# Patient Record
Sex: FEMALE | Race: WHITE | NOT HISPANIC OR LATINO | Employment: FULL TIME | URBAN - METROPOLITAN AREA
[De-identification: names, ages, dates, MRNs, and addresses within clinical notes are randomized per-mention and may not be internally consistent; named-entity substitution may affect disease eponyms.]

---

## 2018-01-16 ENCOUNTER — PATIENT MESSAGE (OUTPATIENT)
Dept: ADMINISTRATIVE | Facility: HOSPITAL | Age: 58
End: 2018-01-16

## 2018-01-19 ENCOUNTER — PATIENT OUTREACH (OUTPATIENT)
Dept: ADMINISTRATIVE | Facility: HOSPITAL | Age: 58
End: 2018-01-19

## 2018-01-25 ENCOUNTER — OFFICE VISIT (OUTPATIENT)
Dept: FAMILY MEDICINE | Facility: CLINIC | Age: 58
End: 2018-01-25
Payer: COMMERCIAL

## 2018-01-25 ENCOUNTER — LAB VISIT (OUTPATIENT)
Dept: LAB | Facility: HOSPITAL | Age: 58
End: 2018-01-25
Attending: FAMILY MEDICINE
Payer: COMMERCIAL

## 2018-01-25 VITALS
SYSTOLIC BLOOD PRESSURE: 129 MMHG | BODY MASS INDEX: 27.28 KG/M2 | TEMPERATURE: 98 F | WEIGHT: 159.81 LBS | DIASTOLIC BLOOD PRESSURE: 70 MMHG | HEIGHT: 64 IN | HEART RATE: 62 BPM

## 2018-01-25 DIAGNOSIS — L50.8 CHRONIC URTICARIA: ICD-10-CM

## 2018-01-25 DIAGNOSIS — Z00.00 ROUTINE HISTORY AND PHYSICAL EXAMINATION OF ADULT: Primary | ICD-10-CM

## 2018-01-25 DIAGNOSIS — Z00.00 ROUTINE HISTORY AND PHYSICAL EXAMINATION OF ADULT: ICD-10-CM

## 2018-01-25 LAB
ALBUMIN SERPL BCP-MCNC: 3.7 G/DL
ALP SERPL-CCNC: 82 U/L
ALT SERPL W/O P-5'-P-CCNC: 16 U/L
ANION GAP SERPL CALC-SCNC: 9 MMOL/L
AST SERPL-CCNC: 19 U/L
BASOPHILS # BLD AUTO: 0.04 K/UL
BASOPHILS NFR BLD: 0.6 %
BILIRUB SERPL-MCNC: 0.3 MG/DL
BUN SERPL-MCNC: 20 MG/DL
CALCIUM SERPL-MCNC: 9.8 MG/DL
CHLORIDE SERPL-SCNC: 109 MMOL/L
CO2 SERPL-SCNC: 27 MMOL/L
CREAT SERPL-MCNC: 0.8 MG/DL
DIFFERENTIAL METHOD: ABNORMAL
EOSINOPHIL # BLD AUTO: 0.3 K/UL
EOSINOPHIL NFR BLD: 5 %
ERYTHROCYTE [DISTWIDTH] IN BLOOD BY AUTOMATED COUNT: 14.7 %
ERYTHROCYTE [SEDIMENTATION RATE] IN BLOOD BY WESTERGREN METHOD: 7 MM/HR
EST. GFR  (AFRICAN AMERICAN): >60 ML/MIN/1.73 M^2
EST. GFR  (NON AFRICAN AMERICAN): >60 ML/MIN/1.73 M^2
GLUCOSE SERPL-MCNC: 88 MG/DL
HCT VFR BLD AUTO: 41.2 %
HGB BLD-MCNC: 13.3 G/DL
IMM GRANULOCYTES # BLD AUTO: 0.01 K/UL
IMM GRANULOCYTES NFR BLD AUTO: 0.2 %
LYMPHOCYTES # BLD AUTO: 2.1 K/UL
LYMPHOCYTES NFR BLD: 32.3 %
MCH RBC QN AUTO: 28.8 PG
MCHC RBC AUTO-ENTMCNC: 32.3 G/DL
MCV RBC AUTO: 89 FL
MONOCYTES # BLD AUTO: 0.5 K/UL
MONOCYTES NFR BLD: 7.5 %
NEUTROPHILS # BLD AUTO: 3.5 K/UL
NEUTROPHILS NFR BLD: 54.4 %
NRBC BLD-RTO: 0 /100 WBC
PLATELET # BLD AUTO: 278 K/UL
PMV BLD AUTO: 12.5 FL
POTASSIUM SERPL-SCNC: 5.3 MMOL/L
PROT SERPL-MCNC: 7.2 G/DL
RBC # BLD AUTO: 4.62 M/UL
SODIUM SERPL-SCNC: 145 MMOL/L
TSH SERPL DL<=0.005 MIU/L-ACNC: 3.45 UIU/ML
WBC # BLD AUTO: 6.43 K/UL

## 2018-01-25 PROCEDURE — 36415 COLL VENOUS BLD VENIPUNCTURE: CPT | Mod: PO

## 2018-01-25 PROCEDURE — 85651 RBC SED RATE NONAUTOMATED: CPT | Mod: PO

## 2018-01-25 PROCEDURE — 80053 COMPREHEN METABOLIC PANEL: CPT

## 2018-01-25 PROCEDURE — 99999 PR PBB SHADOW E&M-EST. PATIENT-LVL III: CPT | Mod: PBBFAC,,, | Performed by: FAMILY MEDICINE

## 2018-01-25 PROCEDURE — 84443 ASSAY THYROID STIM HORMONE: CPT

## 2018-01-25 PROCEDURE — 85025 COMPLETE CBC W/AUTO DIFF WBC: CPT

## 2018-01-25 PROCEDURE — 99396 PREV VISIT EST AGE 40-64: CPT | Mod: S$GLB,,, | Performed by: FAMILY MEDICINE

## 2018-01-25 RX ORDER — MINERAL OIL
180 ENEMA (ML) RECTAL DAILY
COMMUNITY
End: 2018-09-21

## 2018-01-25 NOTE — PROGRESS NOTES
The patient presents for physical exam.  She's also had some problems with intermittent urticaria for about past 6 weeks.  She saw the dermatologist and is using Allegra but still gets symptoms intermittently.  No wheezing or chest pain or shortness of breath.  Unaware of any obvious exposures.  She does have celiac disease and has been watching her diet and has not had significant GI symptoms recently.  She does occasionally get some rash around the neck    Past Medical History:  Past Medical History:   Diagnosis Date    Celiac disease     DDD (degenerative disc disease), cervical     Duodenitis     Frozen shoulder syndrome     H. pylori infection     Menopausal symptoms      Past Surgical History:   Procedure Laterality Date     SECTION      COLONOSCOPY      COLONOSCOPY N/A 2016    Procedure: COLONOSCOPY;  Surgeon: Roberta Boateng MD;  Location: [facility];  Service: Endoscopy;  Laterality: N/A;    ESOPHAGOGASTRODUODENOSCOPY  [id number]    TUBAL LIGATION       Social History     Social History    Marital status:      Spouse name: N/A    Number of children: N/A    Years of education: N/A     Occupational History    Not on file.     Social History Main Topics    Smoking status: Never Smoker    Smokeless tobacco: Not on file    Alcohol use No    Drug use: No    Sexual activity: Not on file     Other Topics Concern    Not on file     Social History Narrative    No narrative on file     Family History   Problem Relation Age of Onset    Lung cancer Mother     Heart failure Father     Anuerysm Father      abd    Seizures Paternal Aunt      Review of patient's allergies indicates:   Allergen Reactions    Gluten protein     Sulfur      Current Outpatient Prescriptions on File Prior to Visit   Medication Sig Dispense Refill    vitamin D 1000 units Tab Take 185 mg by mouth once daily.      [DISCONTINUED] ascorbic acid (VITAMIN C) 500 MG tablet Take 500 mg by mouth once  "daily.      [DISCONTINUED] folic acid (FOLVITE) 1 MG tablet Take 1 tablet (1 mg total) by mouth once daily. 30 tablet 2    [DISCONTINUED] multivitamin capsule Take 1 capsule by mouth once daily.       No current facility-administered medications on file prior to visit.            ROS:  GENERAL: No fever, chills,  or significant weight changes.  HEENT: No headache or hearing complaints.  No dysphagia  Eyes: No vision complaints  CHEST: Denies HERNANDES, cyanosis, wheezing, cough and sputum production.  CARDIOVASCULAR: Denies chest pain, PND, orthopnea or reduced exercise tolerance.  ABDOMEN: Appetite fine. Denies diarrhea, abdominal pain, hematemesis or blood in stool.  URINARY: No flank pain, dysuria or hematuria.  MUSCULOSKELETAL: No warmth swelling or tenderness of the joints  NEUROLOGIC: No focal weakness numbness or paresthesia  PSYCHIATRIC: Denies depression        OBJECTIVE:     Vitals:    01/25/18 0849   BP: 129/70   Pulse: 62   Temp: 98.4 °F (36.9 °C)   Weight: 72.5 kg (159 lb 13.3 oz)   Height: 5' 4" (1.626 m)     Wt Readings from Last 3 Encounters:   01/25/18 72.5 kg (159 lb 13.3 oz)   06/28/16 73.2 kg (161 lb 6 oz)   06/02/16 71.7 kg (158 lb)     APPEARANCE: Well nourished, well developed, in no acute distress.    HEAD: Normocephalic.  Atraumatic.  No sinus tenderness.  EYES:   Right eye: Pupil reactive.  Conjunctiva clear.    Left eye: Pupil reactive.  Conjunctiva clear.    Both fundi:  Grossly normal to nondilated exam. EOMI.    EARS: TM's intact. Light reflex normal. No retraction or perforation.    NOSE:  clear.  MOUTH & THROAT:  No pharyngeal erythema or exudate. No lesions.  NECK: Supple. No bruits.  No JVD.  No cervical lymphadenopathy.  No thyromegaly.    CHEST: Breath sounds clear bilaterally.  Normal respiratory effort  CARDIOVASCULAR: Normal rate.  Regular rhythm.  No murmurs.  No rub.  No gallops.  ABDOMEN: Bowel sounds normal.  Soft.  No tenderness.  No organomegaly.  PERIPHERAL VASCULAR: No " cyanosis.  No clubbing.  No edema.  NEUROLOGIC: No focal findings.  MENTAL STATUS: Alert.  Oriented x 3.        Meg was seen today for annual exam.    Diagnoses and all orders for this visit:    Routine history and physical examination of adult  -     CBC auto differential; Future  -     Comprehensive metabolic panel; Future  -     TSH; Future    Chronic urticaria  -     CBC auto differential; Future  -     Comprehensive metabolic panel; Future  -     TSH; Future  -     Sedimentation rate, manual; Future  -     Ambulatory referral to Allergy      She'll continue the Allegra.Anticipatory guidance: Don't smoke.  Healthy diet and regular exercise recommended.  She has mammogram and Pap smear done through her gynecologist

## 2018-02-05 ENCOUNTER — OFFICE VISIT (OUTPATIENT)
Dept: ALLERGY | Facility: CLINIC | Age: 58
End: 2018-02-05
Payer: COMMERCIAL

## 2018-02-05 ENCOUNTER — LAB VISIT (OUTPATIENT)
Dept: LAB | Facility: HOSPITAL | Age: 58
End: 2018-02-05
Attending: ALLERGY & IMMUNOLOGY
Payer: COMMERCIAL

## 2018-02-05 VITALS — WEIGHT: 158.94 LBS | HEART RATE: 60 BPM | OXYGEN SATURATION: 98 % | BODY MASS INDEX: 27.13 KG/M2 | HEIGHT: 64 IN

## 2018-02-05 DIAGNOSIS — J31.0 OTHER CHRONIC RHINITIS: ICD-10-CM

## 2018-02-05 DIAGNOSIS — L50.8 CHRONIC URTICARIA: Primary | ICD-10-CM

## 2018-02-05 DIAGNOSIS — L50.8 CHRONIC URTICARIA: ICD-10-CM

## 2018-02-05 LAB
THYROPEROXIDASE IGG SERPL-ACNC: <6 IU/ML
TSH SERPL DL<=0.005 MIU/L-ACNC: 2.78 UIU/ML

## 2018-02-05 PROCEDURE — 84445 ASSAY OF TSI GLOBULIN: CPT

## 2018-02-05 PROCEDURE — 84165 PROTEIN E-PHORESIS SERUM: CPT | Mod: 26,,, | Performed by: PATHOLOGY

## 2018-02-05 PROCEDURE — 86376 MICROSOMAL ANTIBODY EACH: CPT

## 2018-02-05 PROCEDURE — 84443 ASSAY THYROID STIM HORMONE: CPT

## 2018-02-05 PROCEDURE — 99244 OFF/OP CNSLTJ NEW/EST MOD 40: CPT | Mod: S$GLB,,, | Performed by: ALLERGY & IMMUNOLOGY

## 2018-02-05 PROCEDURE — 88184 FLOWCYTOMETRY/ TC 1 MARKER: CPT

## 2018-02-05 PROCEDURE — 86003 ALLG SPEC IGE CRUDE XTRC EA: CPT

## 2018-02-05 PROCEDURE — 99999 PR PBB SHADOW E&M-EST. PATIENT-LVL III: CPT | Mod: PBBFAC,,, | Performed by: ALLERGY & IMMUNOLOGY

## 2018-02-05 PROCEDURE — 86003 ALLG SPEC IGE CRUDE XTRC EA: CPT | Mod: 59

## 2018-02-05 PROCEDURE — 84165 PROTEIN E-PHORESIS SERUM: CPT

## 2018-02-05 NOTE — LETTER
February 5, 2018      Pablo Ng MD  [address] [address]           [facility]  [address] [facility] [address] [place] [address]  Phone: [phone]          Patient: Meg Goff   MR Number: [id number]   YOB: 1960   Date of Visit: 2/5/2018       Dear Dr. Pablo Ng:    Thank you for referring Meg Goff to me for evaluation. Attached you will find relevant portions of my assessment and plan of care.    If you have questions, please do not hesitate to call me. I look forward to following Meg Goff along with you.    Sincerely,    Claudia Leroy MD    Enclosure  CC:  No Recipients    If you would like to receive this communication electronically, please contact [email or web address] or [phone] to request more information on [facility] access.    For providers and/or their staff who would like to refer a patient to [facility], please contact us through our one-stop-shop provider referral line, [facility], at [phone].    If you feel you have received this communication in error or would no longer like to receive these types of communications, please e-mail [email or web address]

## 2018-02-05 NOTE — PROGRESS NOTES
Subjective:       Patient ID: Meg Goff is a 57 y.o. female.    Chief Complaint:  Allergic Reaction (allergic reaction, no known cause.)      58 yo woman presents for consult from Dr Pablo Ng for hives. She states it started in November. Has been off and on, breaks out for a day then resolves and comes back randomly. No triggers. She has pictures of hives, they are red raised and very itchy. She states in November was at scalp line and she thought was because of night sweats. Then had some on elbows which were more like blisters, clear fluid and last 2-3 days then started in December with these hives. She does have celiac and knows you can get skin rashes but her diet is very well controlled, no changes. She has no swelling, no SOB, no rhinitis, no other symptoms with it. She has occ stuffy nose, not bad, no season. No asthma or eczema. No known food, insect or latex allergy. Has celiac and no other medical issues. No sinus surgery. She is taking allegra prn with relief. No daily medications.         Environmental History: see history section for home environment  Review of Systems   Constitutional: Negative for appetite change, chills, fatigue and fever.   HENT: Positive for congestion. Negative for ear discharge, ear pain, facial swelling, nosebleeds, postnasal drip, rhinorrhea, sinus pressure, sneezing, sore throat, trouble swallowing and voice change.    Eyes: Negative for discharge, redness, itching and visual disturbance.   Respiratory: Negative for cough, choking, chest tightness, shortness of breath and wheezing.    Cardiovascular: Negative for chest pain, palpitations and leg swelling.   Gastrointestinal: Negative for abdominal distention, abdominal pain, constipation, diarrhea, nausea and vomiting.   Genitourinary: Negative for difficulty urinating.   Musculoskeletal: Negative for arthralgias, gait problem, joint swelling and myalgias.   Skin: Positive for rash. Negative for color change.    Neurological: Negative for dizziness, syncope, weakness, light-headedness and headaches.   Hematological: Negative for adenopathy. Does not bruise/bleed easily.   Psychiatric/Behavioral: Negative for agitation, behavioral problems, confusion and sleep disturbance. The patient is not nervous/anxious.         Objective:    Physical Exam   Constitutional: She is oriented to person, place, and time. She appears well-developed and well-nourished. No distress.   HENT:   Head: Normocephalic and atraumatic.   Right Ear: Hearing, tympanic membrane, external ear and ear canal normal.   Left Ear: Hearing, tympanic membrane, external ear and ear canal normal.   Nose: No mucosal edema, rhinorrhea, sinus tenderness or septal deviation. No epistaxis. Right sinus exhibits no maxillary sinus tenderness and no frontal sinus tenderness. Left sinus exhibits no maxillary sinus tenderness and no frontal sinus tenderness.   Mouth/Throat: Uvula is midline, oropharynx is clear and moist and mucous membranes are normal. No uvula swelling.   Eyes: Conjunctivae are normal. Right eye exhibits no discharge. Left eye exhibits no discharge.   Neck: Normal range of motion. No thyromegaly present.   Cardiovascular: Normal rate, regular rhythm and normal heart sounds.    No murmur heard.  Pulmonary/Chest: Effort normal and breath sounds normal. No respiratory distress. She has no wheezes.   Abdominal: Soft. She exhibits no distension. There is no tenderness.   Musculoskeletal: Normal range of motion. She exhibits no edema or tenderness.   Lymphadenopathy:     She has no cervical adenopathy.   Neurological: She is alert and oriented to person, place, and time.   Skin: Skin is warm and dry. No rash noted. No erythema.   Psychiatric: She has a normal mood and affect. Her behavior is normal. Judgment and thought content normal.   Nursing note and vitals reviewed.      Laboratory:   none performed   Assessment:       1. Chronic urticaria    2. Other  chronic rhinitis         Plan:       1. Explained chronic hives to pt,a advised less likely allergy and more likely systemic vis idiopathic/autoimmune. Will send labs to further evaluate  2. continue allegra daily and can increase to bid if needed and benadryl 25 every 4 hours as needed  3. Phone review  4. Dr Ng notified of completed consult via [facility]

## 2018-02-06 LAB
ALBUMIN SERPL ELPH-MCNC: 4.03 G/DL
ALPHA1 GLOB SERPL ELPH-MCNC: 0.27 G/DL
ALPHA2 GLOB SERPL ELPH-MCNC: 0.81 G/DL
B-GLOBULIN SERPL ELPH-MCNC: 0.89 G/DL
GAMMA GLOB SERPL ELPH-MCNC: 0.99 G/DL
PATHOLOGIST INTERPRETATION SPE: NORMAL
PROT SERPL-MCNC: 7 G/DL

## 2018-02-07 LAB
A ALTERNATA IGE QN: <0.35 KU/L
A FUMIGATUS IGE QN: <0.35 KU/L
ALLERGEN WHEAT IGE: <0.35 KU/L
ALMOND IGE QN: <0.35 KU/L
BAHIA GRASS IGE QN: <0.35 KU/L
BALD CYPRESS IGE QN: <0.35 KU/L
CAT DANDER IGE QN: 0.46 KU/L
COMMON RAGWEED IGE QN: <0.35 KU/L
COW MILK IGE QN: <0.35 KU/L
D FARINAE IGE QN: 1.03 KU/L
D PTERONYSS IGE QN: 1.45 KU/L
DEPRECATED A ALTERNATA IGE RAST QL: NORMAL
DEPRECATED A FUMIGATUS IGE RAST QL: NORMAL
DEPRECATED ALMOND IGE RAST QL: NORMAL
DEPRECATED BAHIA GRASS IGE RAST QL: NORMAL
DEPRECATED BALD CYPRESS IGE RAST QL: NORMAL
DEPRECATED CAT DANDER IGE RAST QL: ABNORMAL
DEPRECATED COMMON RAGWEED IGE RAST QL: NORMAL
DEPRECATED COW MILK IGE RAST QL: NORMAL
DEPRECATED D FARINAE IGE RAST QL: ABNORMAL
DEPRECATED D PTERONYSS IGE RAST QL: ABNORMAL
DEPRECATED DOG DANDER IGE RAST QL: ABNORMAL
DEPRECATED EGG WHITE IGE RAST QL: NORMAL
DEPRECATED MARSH ELDER IGE RAST QL: NORMAL
DEPRECATED OAT IGE RAST QL: NORMAL
DEPRECATED PEANUT IGE RAST QL: NORMAL
DEPRECATED PECAN/HICK NUT IGE RAST QL: NORMAL
DEPRECATED PECAN/HICK TREE IGE RAST QL: NORMAL
DEPRECATED SHRIMP IGE RAST QL: NORMAL
DEPRECATED SOYBEAN IGE RAST QL: NORMAL
DEPRECATED TIMOTHY IGE RAST QL: NORMAL
DEPRECATED WHITE OAK IGE RAST QL: NORMAL
DOG DANDER IGE QN: 0.47 KU/L
EGG WHITE IGE QN: <0.35 KU/L
MARSH ELDER IGE QN: <0.35 KU/L
OAT IGE QN: <0.35 KU/L
PEANUT IGE QN: <0.35 KU/L
PECAN/HICK NUT IGE QN: <0.35 KU/L
PECAN/HICK TREE IGE QN: <0.35 KU/L
SHRIMP IGE QN: <0.35 KU/L
SOYBEAN IGE QN: <0.35 KU/L
TIMOTHY IGE QN: <0.35 KU/L
TSI SER-ACNC: <0.1 IU/L
WHEAT CLASS: NORMAL
WHITE OAK IGE QN: <0.35 KU/L

## 2018-02-14 LAB
CIU ASSOCIATED BASOPHIL ACTIVATION: 1.6 % (ref 0–12)
IGE RECEPTOR ANTIBODY: NORMAL

## 2018-02-15 ENCOUNTER — PATIENT MESSAGE (OUTPATIENT)
Dept: ALLERGY | Facility: CLINIC | Age: 58
End: 2018-02-15

## 2018-09-21 ENCOUNTER — OFFICE VISIT (OUTPATIENT)
Dept: FAMILY MEDICINE | Facility: CLINIC | Age: 58
End: 2018-09-21
Payer: COMMERCIAL

## 2018-09-21 VITALS
BODY MASS INDEX: 27.31 KG/M2 | WEIGHT: 160 LBS | TEMPERATURE: 100 F | DIASTOLIC BLOOD PRESSURE: 75 MMHG | HEART RATE: 70 BPM | SYSTOLIC BLOOD PRESSURE: 127 MMHG | HEIGHT: 64 IN

## 2018-09-21 DIAGNOSIS — J01.90 SUBACUTE SINUSITIS, UNSPECIFIED LOCATION: Primary | ICD-10-CM

## 2018-09-21 PROCEDURE — 99999 PR PBB SHADOW E&M-EST. PATIENT-LVL III: CPT | Mod: PBBFAC,,, | Performed by: FAMILY MEDICINE

## 2018-09-21 PROCEDURE — 3008F BODY MASS INDEX DOCD: CPT | Mod: CPTII,S$GLB,, | Performed by: FAMILY MEDICINE

## 2018-09-21 PROCEDURE — 99213 OFFICE O/P EST LOW 20 MIN: CPT | Mod: S$GLB,,, | Performed by: FAMILY MEDICINE

## 2018-09-21 RX ORDER — METHYLPREDNISOLONE 4 MG/1
TABLET ORAL
Qty: 21 TABLET | Refills: 0 | Status: SHIPPED | OUTPATIENT
Start: 2018-09-21 | End: 2018-12-21

## 2018-09-21 RX ORDER — MONTELUKAST SODIUM 10 MG/1
TABLET ORAL
Refills: 0 | COMMUNITY
Start: 2018-08-10 | End: 2018-09-21

## 2018-09-21 RX ORDER — PSEUDOEPHEDRINE HCL 120 MG/1
120 TABLET, FILM COATED, EXTENDED RELEASE ORAL
COMMUNITY
End: 2019-02-01

## 2018-09-21 RX ORDER — AMOXICILLIN AND CLAVULANATE POTASSIUM 875; 125 MG/1; MG/1
1 TABLET, FILM COATED ORAL 2 TIMES DAILY
Qty: 20 TABLET | Refills: 0 | Status: SHIPPED | OUTPATIENT
Start: 2018-09-21 | End: 2018-10-01

## 2018-09-21 NOTE — PROGRESS NOTES
Patient complains of sinus discomfort, congestion, sore throat, postnasal drainage, slight cough, no fever. Symptoms started over the past 2 months..  She did have an urgent care visit new was treated with steroids with temporary partial improvement but never resolved.  States some intermittent mild blood with the discharge during the past week when symptoms started increasing again.  Past Medical History:  Past Medical History:   Diagnosis Date    Celiac disease     DDD (degenerative disc disease), cervical     Duodenitis     Frozen shoulder syndrome     H. pylori infection     Menopausal symptoms      Past Surgical History:   Procedure Laterality Date     SECTION      COLONOSCOPY      COLONOSCOPY N/A 2016    Procedure: COLONOSCOPY;  Surgeon: Roberta Boateng MD;  Location: [facility];  Service: Endoscopy;  Laterality: N/A;    COLONOSCOPY N/A 2016    Performed by [facility] at [facility]    ESOPHAGOGASTRODUODENOSCOPY  [id number]    ESOPHAGOGASTRODUODENOSCOPY (EGD) N/A 2016    Performed by [facility] at [facility] ENDO    TUBAL LIGATION       Review of patient's allergies indicates:   Allergen Reactions    Gluten protein     Sulfur      Current Outpatient Medications on File Prior to Visit   Medication Sig Dispense Refill    pseudoephedrine (SUDAFED) 120 mg 12 hr tablet Take 120 mg by mouth every 12 (twelve) hours.      [DISCONTINUED] montelukast (SINGULAIR) 10 mg tablet TK 1 T PO  QD  0    [DISCONTINUED] fexofenadine (ALLEGRA) 180 MG tablet Take 180 mg by mouth once daily.      [DISCONTINUED] vitamin D 1000 units Tab Take 185 mg by mouth once daily.       No current facility-administered medications on file prior to visit.      Social History     Socioeconomic History    Marital status:      Spouse name: Not on file    Number of children: Not on file    Years of education: Not on file    Highest education level: Not on file   Social Needs     Financial resource strain: Not on file    Food insecurity - worry: Not on file    Food insecurity - inability: Not on file    Transportation needs - medical: Not on file    Transportation needs - non-medical: Not on file   Occupational History    Not on file   Tobacco Use    Smoking status: Never Smoker    Smokeless tobacco: Never Used   Substance and Sexual Activity    Alcohol use: No    Drug use: No    Sexual activity: Not on file   Other Topics Concern    Not on file   Social History Narrative    Not on file     Family History   Problem Relation Age of Onset    Lung cancer Mother     Heart failure Father     Anuerysm Father         abd    Seizures Paternal Aunt     Allergic rhinitis Neg Hx     Asthma Neg Hx     Atopy Neg Hx     Eczema Neg Hx     Immunodeficiency Neg Hx     Rhinitis Neg Hx     Urticaria Neg Hx     Angioedema Neg Hx     Allergies Neg Hx              Physical Exam: See vital signs note   HEENT: Mild maxillary sinus tenderness. Nose mild congestion. Ears: Tympanic membranes intact.  No effusion or erythema. Throat mild erythema no exudate. Mild postnasal drainage.   Neck supple no adenopathy.    Chest: Clear to auscultation. Normal respiratory effort.     Meg was seen today for sinus problem.    Diagnoses and all orders for this visit:    Subacute sinusitis, unspecified location    Other orders  -     amoxicillin-clavulanate 875-125mg (AUGMENTIN) 875-125 mg per tablet; Take 1 tablet by mouth 2 (two) times daily. for 10 days  -     methylPREDNISolone (MEDROL DOSEPACK) 4 mg tablet; follow package directions        Follow-up as needed if symptoms not improving with treatment prescribed in next few days.  If not resolving with above would probably recommend imaging.

## 2018-12-21 ENCOUNTER — TELEPHONE (OUTPATIENT)
Dept: FAMILY MEDICINE | Facility: CLINIC | Age: 58
End: 2018-12-21

## 2018-12-21 ENCOUNTER — OFFICE VISIT (OUTPATIENT)
Dept: FAMILY MEDICINE | Facility: CLINIC | Age: 58
End: 2018-12-21
Payer: COMMERCIAL

## 2018-12-21 VITALS
TEMPERATURE: 98 F | BODY MASS INDEX: 27.14 KG/M2 | WEIGHT: 159 LBS | DIASTOLIC BLOOD PRESSURE: 68 MMHG | SYSTOLIC BLOOD PRESSURE: 127 MMHG | HEIGHT: 64 IN | HEART RATE: 79 BPM

## 2018-12-21 DIAGNOSIS — J32.9 CHRONIC SINUSITIS, UNSPECIFIED LOCATION: Primary | ICD-10-CM

## 2018-12-21 PROCEDURE — 99213 OFFICE O/P EST LOW 20 MIN: CPT | Mod: S$GLB,,, | Performed by: FAMILY MEDICINE

## 2018-12-21 PROCEDURE — 99999 PR PBB SHADOW E&M-EST. PATIENT-LVL III: CPT | Mod: PBBFAC,,, | Performed by: FAMILY MEDICINE

## 2018-12-21 PROCEDURE — 3008F BODY MASS INDEX DOCD: CPT | Mod: CPTII,S$GLB,, | Performed by: FAMILY MEDICINE

## 2018-12-21 RX ORDER — DOXYCYCLINE 100 MG/1
100 CAPSULE ORAL 2 TIMES DAILY
Qty: 28 CAPSULE | Refills: 0 | Status: SHIPPED | OUTPATIENT
Start: 2018-12-21 | End: 2019-01-04

## 2018-12-21 RX ORDER — METHYLPREDNISOLONE 4 MG/1
TABLET ORAL
Qty: 21 TABLET | Refills: 0 | Status: SHIPPED | OUTPATIENT
Start: 2018-12-21 | End: 2019-02-01

## 2018-12-21 NOTE — PROGRESS NOTES
Patient complains of sinus discomfort, congestion, sore throat, postnasal drainage, slight cough, no fever.  She had symptoms going back into approximately July.  We saw her in September and treated her with Augmentin and steroids for sinusitis.  She states symptoms improved for a few weeks, then recurred again for the past approximately 2 months.  Seems to be little worse in the past week.    Past Medical History:  Past Medical History:   Diagnosis Date    Celiac disease     DDD (degenerative disc disease), cervical     Duodenitis     Frozen shoulder syndrome     H. pylori infection     Menopausal symptoms      Past Surgical History:   Procedure Laterality Date     SECTION      COLONOSCOPY      COLONOSCOPY N/A 2016    [facility]formed by [facility] at [facility] ENDO    ESOPHAGOGASTRODUODENOSCOPY  [id number]    ESOPHAGOGASTRODUODENOSCOPY (EGD) N/A 2016    Performed by [facility] at [facility] ENDO    TUBAL LIGATION       Review of patient's allergies indicates:   Allergen Reactions    Gluten protein     Sulfur      Current Outpatient Medications on File Prior to Visit   Medication Sig Dispense Refill    pseudoephedrine (SUDAFED) 120 mg 12 hr tablet Take 120 mg by mouth every 12 (twelve) hours.      [DISCONTINUED] methylPREDNISolone (MEDROL DOSEPACK) 4 mg tablet follow package directions 21 tablet 0     No current facility-administered medications on file prior to visit.      Social History     Socioeconomic History    Marital status:      Spouse name: Not on file    Number of children: Not on file    Years of education: Not on file    Highest education level: Not on file   Social Needs    Financial resource strain: Not on file    Food insecurity - worry: Not on file    Food insecurity - inability: Not on file    Transportation needs - medical: Not on file    Transportation needs - non-medical: Not on file   Occupational History    Not on file   Tobacco Use     Smoking status: Never Smoker    Smokeless tobacco: Never Used   Substance and Sexual Activity    Alcohol use: No    Drug use: No    Sexual activity: Not on file   Other Topics Concern    Not on file   Social History Narrative    Not on file     Family History   Problem Relation Age of Onset    Lung cancer Mother     Heart failure Father     Anuerysm Father         abd    Seizures Paternal Aunt     Allergic rhinitis Neg Hx     Asthma Neg Hx     Atopy Neg Hx     Eczema Neg Hx     Immunodeficiency Neg Hx     Rhinitis Neg Hx     Urticaria Neg Hx     Angioedema Neg Hx     Allergies Neg Hx              Physical Exam: See vital signs note   HEENT: Mild maxillary sinus tenderness. Nose mild congestion. Ears: Tympanic membranes intact.  No effusion or erythema. Throat mild erythema no exudate. Mild postnasal drainage.   Neck supple no adenopathy.    Chest: Clear to auscultation. Normal respiratory effort.     Meg was seen today for sinus problem.    Diagnoses and all orders for this visit:    Chronic sinusitis, unspecified location  -     CT Sinuses without Contrast; Future    Other orders  -     methylPREDNISolone (MEDROL DOSEPACK) 4 mg tablet; follow package directions  -     doxycycline (MONODOX) 100 MG capsule; Take 1 capsule (100 mg total) by mouth 2 (two) times daily. for 14 days      Arrange CT in 2 weeks after completing the doxycycline.  Further evaluation pending clinical response and results of imaging.  Follow-up if worsens or not resolved.

## 2018-12-21 NOTE — TELEPHONE ENCOUNTER
----- Message from Verito Dumas sent at 12/21/2018  8:53 AM CST -----  Contact: Patient   Patient would like a call back at [phone], Regards to getting work in for sinus/cold.    Thanks  td

## 2019-01-19 ENCOUNTER — HOSPITAL ENCOUNTER (OUTPATIENT)
Dept: RADIOLOGY | Facility: HOSPITAL | Age: 59
Discharge: HOME | End: 2019-01-19
Attending: FAMILY MEDICINE
Payer: COMMERCIAL

## 2019-01-19 DIAGNOSIS — J32.9 CHRONIC SINUSITIS, UNSPECIFIED LOCATION: ICD-10-CM

## 2019-01-19 PROCEDURE — 70486 CT MAXILLOFACIAL W/O DYE: CPT | Mod: TC

## 2019-02-01 ENCOUNTER — OFFICE VISIT (OUTPATIENT)
Dept: OTOLARYNGOLOGY | Facility: CLINIC | Age: 59
End: 2019-02-01
Payer: COMMERCIAL

## 2019-02-01 VITALS
DIASTOLIC BLOOD PRESSURE: 72 MMHG | WEIGHT: 161.38 LBS | HEART RATE: 64 BPM | SYSTOLIC BLOOD PRESSURE: 131 MMHG | BODY MASS INDEX: 27.7 KG/M2

## 2019-02-01 DIAGNOSIS — H69.93 DYSFUNCTION OF BOTH EUSTACHIAN TUBES: ICD-10-CM

## 2019-02-01 DIAGNOSIS — J01.90 SUBACUTE SINUSITIS, UNSPECIFIED LOCATION: Primary | ICD-10-CM

## 2019-02-01 PROCEDURE — 99999 PR PBB SHADOW E&M-EST. PATIENT-LVL III: CPT | Mod: PBBFAC,,, | Performed by: PHYSICIAN ASSISTANT

## 2019-02-01 PROCEDURE — 99244 OFF/OP CNSLTJ NEW/EST MOD 40: CPT | Mod: S$GLB,,, | Performed by: PHYSICIAN ASSISTANT

## 2019-02-01 PROCEDURE — 99999 PR PBB SHADOW E&M-EST. PATIENT-LVL III: ICD-10-PCS | Mod: PBBFAC,,, | Performed by: PHYSICIAN ASSISTANT

## 2019-02-01 PROCEDURE — 99244 PR OFFICE CONSULTATION,LEVEL IV: ICD-10-PCS | Mod: S$GLB,,, | Performed by: PHYSICIAN ASSISTANT

## 2019-02-01 RX ORDER — LEVOFLOXACIN 500 MG/1
500 TABLET, FILM COATED ORAL DAILY
Qty: 21 TABLET | Refills: 0 | Status: SHIPPED | OUTPATIENT
Start: 2019-02-01 | End: 2019-02-22

## 2019-02-01 RX ORDER — METRONIDAZOLE 500 MG/1
500 TABLET ORAL EVERY 12 HOURS
Qty: 42 TABLET | Refills: 0 | Status: SHIPPED | OUTPATIENT
Start: 2019-02-01 | End: 2019-02-22

## 2019-02-01 RX ORDER — PAROXETINE 10 MG/1
1 TABLET, FILM COATED ORAL DAILY
COMMUNITY
Start: 2019-01-03 | End: 2019-07-30

## 2019-02-01 NOTE — LETTER
February 1, 2019      Pablo Ng MD  [address] [facility] [address]           [facility]  [address] [place] [address]  Phone: [phone]  Fax: [phone]          Patient: Meg Goff   MR Number: [id number]   YOB: 1960   Date of Visit: 2/1/2019       Dear Dr. Pablo Ng:    Thank you for referring Meg Goff to me for evaluation. Attached you will find relevant portions of my assessment and plan of care.    If you have questions, please do not hesitate to call me. I look forward to following Meg Goff along with you.    Sincerely,    Monique Ansari PA-C    Enclosure  CC:  No Recipients    If you would like to receive this communication electronically, please contact [email or web address] or [phone] to request more information on [employer] access.    For providers and/or their staff who would like to refer a patient to [facility], please contact us through our one-stop-shop provider referral line, [facility], at [phone].    If you feel you have received this communication in error or would no longer like to receive these types of communications, please e-mail [email or web address]

## 2019-02-01 NOTE — PROGRESS NOTES
Referring Provider:    Pablo Ng Md  [id number] [place] [address]  Subjective:   Patient: Meg Goff [id number], :1960   Visit date:2019 11:57 AM    Chief Complaint:  Consult    HPI:  Meg is a 58 y.o. female who I was asked to see in consultation for evaluation of the following issue(s):  Recurrent sinusitis since late Summer 2018.  Before then, she typically had one sinus infection every 12-18 months.  She's been treated with Augmentin and Doxycycline as well as several steroid injections and steroid dose packs (2018 and 2018).  No previous sinus surgery.  She does not smoke.  She tried Flonase in past but did not like the aftertaste.      MAJOR SYMPTOMS:  Yes  Purulent anterior nasal discharge (comes and goes)  Yes  Purulent or discolored posterior nasal discharge  Yes  Nasal congestion or obstruction (alternates sides)  No  Facial Congestion or fullness  No  Hyposmia or anosmia  No  Fever    MINOR SYMPTOMS:  Yes  Headache (top of her head and behind her eyes)  Yes  Ear pain, pressure, or fullness - pressure and popping AU; muffled AU for minutes at a time  No  Halitosis  No  Dental pain  Yes  Fatigue    The diagnosis of acute sinusitis is based on the presence of at least 2 major or 1 major and at least 2 minor symptoms.  Meg does meet this criteria.  Clinical Practice Guideline for Acute Bacterial Rhinosinusitis in Children and Adults. Clin Infect Dis 2012; 54:e72    Onset:  5 months ago  Exacerbating factors: Yes - bending over  Relieving factors: temporary while on antibiotics and steroids  Timing:  unchanged          Review of Systems:  Negative unless checked off.  Gen:  []fever   [x]fatigue  HENT:  []nosebleeds  []dental problem   Eyes:  []photophobia  []visual disturbance  Resp:  []chest tightness []wheezing  Card:  []chest pain  []leg swelling  GI:  []abdominal pain []blood in stool  :  []dysuria  []hematuria  Musc:  []joint swelling  []gait problem  Skin:   []color change  []pallor  Neuro:  []seizures  []numbness  Hem:  []bruise/bleed easily  Psych:  []hallucinations  []behavioral problems  Allergy/Imm: is allergic to gluten protein and sulfur.    Her meds, allergies, medical, surgical, social & family histories were reviewed & updated:  -     She has a current medication list which includes the following prescription(s): paroxetine, levofloxacin, and metronidazole.  -     She  has a past medical history of Celiac disease, DDD (degenerative disc disease), cervical, Duodenitis, Frozen shoulder syndrome, H. pylori infection, and Menopausal symptoms.   -     She does not have any pertinent problems on file.   -     She  has a past surgical history that includes  section; Tubal ligation; Esophagogastroduodenoscopy (10/1790126); Colonoscopy; and Colonoscopy (N/A, 2016).  -     She  reports that  has never smoked. she has never used smokeless tobacco. She reports that she does not drink alcohol or use drugs.  -     Her family history includes Anuerysm in her father; Heart failure in her father; Lung cancer in her mother; Seizures in her paternal aunt.  -     She is allergic to gluten protein and sulfur.    Objective:     Physical Exam:  Vitals:  /72   Pulse 64   Wt 73.2 kg (161 lb 6 oz)   BMI 27.70 kg/m²   General appearance:  Well developed, well nourished    Eyes:  Extraocular motions intact, PERRL    Communication:  no hoarseness, no dysphonia    Ears:  Otoscopy of external auditory canals was normal and tympanic membranes were retracted with no SNONY, clinical speech reception thresholds grossly intact, no mass/lesion of auricle.  Nose:  No masses/lesions of external nose, nasal mucosa erythematous, septum w/spur on left, and turbinates were within normal limits.  Able to see head of left middle turbinate.  No sinus tenderness  Mouth:  No mass/lesion of lips, teeth, gums, hard/soft palate, tongue, tonsils, or oropharynx.  Invisalign retainers on her  teeth    Cardiovascular:  Radial Pulses +2     Neck & Lymphatics:  No cervical lymphadenopathy, no neck mass/crepitus/ asymmetry, trachea is midline, no thyroid enlargement/tenderness/mass.    Psych: Oriented x3,  Alert with normal mood and affect.     Respiration/Chest:  Symmetric expansion during respiration, normal respiratory effort.    Skin:  Warm and intact. No ulcerations of face, scalp, neck.      CT Sinus 12/2018:  EXAMINATION:  CT SINUSES WITHOUT CONTRAST    CLINICAL HISTORY:  Sinusitis, recurring, possible surgery;.    TECHNIQUE:  Low dose axial images were obtained through the head.  Coronal and sagittal reformations were also performed. Contrast was not administered.    COMPARISON:  None.    FINDINGS:  Minimal chronic mucosal thickening identified circumferentially throughout the left maxillary antrum measuring approximately 1.0 mm in maximal with.  A small polyp or retention cyst is identified at the medial wall of the upper left maxillary antrum measuring 4.5 cm.  The left ostiomeatal complex is patent.    The right maxillary antrum demonstrates minimal chronic mucosal thickening in the inferior medial wall.  Mucosal thickening measures approximately 2 mm in maximal thickness.  Mild mucosal thickening identified at the ostia.  The ostia and infundibulum of the right ostiomeatal complex are patent.    Mild scattered mucosal thickening and patchy opacification of the ethmoid air cells bilaterally.  Mild chronic vessel thickening identified left frontoethmoidal recess.  The frontal sinuses are small in size but otherwise clear.  Sphenoid sinuses are clear.    Mild leftward nasal septal deviation is identified with associated spurring.  Middle and inferior nasal turbinates are normal.    The orbits and globes are normal.  Facial soft tissues are normal.  The visualized intracranial contents are normal.        Previous Allergy testing 2/2018 with Dr. Leroy for hives:  Positive for cat, dog,  dustmites      Assessment & Plan:   Meg was seen today for consult.    Diagnoses and all orders for this visit:    Subacute sinusitis, unspecified location    Dysfunction of both eustachian tubes    Other orders  -     metroNIDAZOLE (FLAGYL) 500 MG tablet; Take 1 tablet (500 mg total) by mouth every 12 (twelve) hours. for 21 days  -     levoFLOXacin (LEVAQUIN) 500 MG tablet; Take 1 tablet (500 mg total) by mouth once daily. for 21 days      Reviewed her recent sinus CT report and images with patient in the clinic today.  She does sound symptomatic with ethmoid sinusitis.  Discussed that the small retention cyst would not be causing her symptoms.   Recommend prolonged course of oral antibiotics and then RTC in 4 weeks with Dr. Wilder for recheck.  Discussed that she should call sooner with worsening symptoms.  If no improvement with prolonged course of abx, may need to consider surgical intervention.  No additional steroids at this time.  OK for plain Mucinex BID and frequent nasal saline spray.    I would recommend the patient use OTC Flonase Sensimist nasal spray.  This formulation is an alcohol and fragrance free version of a nasal steroid spray, and is often very helpful for those patients that cannot tolerate other nasal steroid sprays.  It also delivers the medication in the form of a mist rather than a spray, and has a decreased incidence of epistaxis. We had a long discussion regarding the anatomy and function of the eustachian tube.  We discussed that the eustachian tube acts as a pump to keep the appropriate amount of pressure behind the ear drum.  Treatment consists of intranasal steroid spray.        We discussed her medical conditions, treatments and plan.  Meg should return to clinic if any issues arise (symptoms worsen or persist), otherwise we will see her back in the clinic In 4 weeks after abx completed.    Thank you for allowing me to participate in the care of Meg.

## 2019-04-16 ENCOUNTER — PATIENT MESSAGE (OUTPATIENT)
Dept: OTOLARYNGOLOGY | Facility: CLINIC | Age: 59
End: 2019-04-16

## 2019-04-17 RX ORDER — METHYLPREDNISOLONE 4 MG/1
TABLET ORAL
Qty: 1 PACKAGE | Refills: 0 | Status: SHIPPED | OUTPATIENT
Start: 2019-04-17 | End: 2019-07-30

## 2019-04-17 RX ORDER — AMOXICILLIN AND CLAVULANATE POTASSIUM 875; 125 MG/1; MG/1
1 TABLET, FILM COATED ORAL 2 TIMES DAILY
Qty: 20 TABLET | Refills: 0 | Status: SHIPPED | OUTPATIENT
Start: 2019-04-17 | End: 2019-04-27

## 2019-04-17 NOTE — TELEPHONE ENCOUNTER
Augmentin and Medrol Dose Denilson sent in as requested.  She needs to schedule followup with Dr. Wilder as discussed at her last clinic visit.

## 2019-07-29 ENCOUNTER — TELEPHONE (OUTPATIENT)
Dept: OTOLARYNGOLOGY | Facility: CLINIC | Age: 59
End: 2019-07-29

## 2019-07-29 NOTE — TELEPHONE ENCOUNTER
Pt scheduled to see Dr Wilder tomorrow at [place] location. Pt verbalized understanding.        ----- Message from Vandana Perry sent at 7/29/2019  2:01 PM CDT -----  Contact: pt  Please call pt @ [phone] regarding appt tomorrow,

## 2019-07-30 ENCOUNTER — OFFICE VISIT (OUTPATIENT)
Dept: OTOLARYNGOLOGY | Facility: CLINIC | Age: 59
End: 2019-07-30
Payer: COMMERCIAL

## 2019-07-30 VITALS
BODY MASS INDEX: 26.76 KG/M2 | DIASTOLIC BLOOD PRESSURE: 77 MMHG | HEART RATE: 69 BPM | HEIGHT: 64 IN | SYSTOLIC BLOOD PRESSURE: 133 MMHG | RESPIRATION RATE: 16 BRPM | WEIGHT: 156.75 LBS | TEMPERATURE: 97 F

## 2019-07-30 DIAGNOSIS — J34.3 NASAL TURBINATE HYPERTROPHY: ICD-10-CM

## 2019-07-30 DIAGNOSIS — H69.93 DYSFUNCTION OF BOTH EUSTACHIAN TUBES: Primary | ICD-10-CM

## 2019-07-30 PROCEDURE — 3008F PR BODY MASS INDEX (BMI) DOCUMENTED: ICD-10-PCS | Mod: CPTII,S$GLB,, | Performed by: OTOLARYNGOLOGY

## 2019-07-30 PROCEDURE — 99999 PR PBB SHADOW E&M-EST. PATIENT-LVL III: CPT | Mod: PBBFAC,,, | Performed by: OTOLARYNGOLOGY

## 2019-07-30 PROCEDURE — 99999 PR PBB SHADOW E&M-EST. PATIENT-LVL III: ICD-10-PCS | Mod: PBBFAC,,, | Performed by: OTOLARYNGOLOGY

## 2019-07-30 PROCEDURE — 99214 OFFICE O/P EST MOD 30 MIN: CPT | Mod: S$GLB,,, | Performed by: OTOLARYNGOLOGY

## 2019-07-30 PROCEDURE — 3008F BODY MASS INDEX DOCD: CPT | Mod: CPTII,S$GLB,, | Performed by: OTOLARYNGOLOGY

## 2019-07-30 PROCEDURE — 99214 PR OFFICE/OUTPT VISIT, EST, LEVL IV, 30-39 MIN: ICD-10-PCS | Mod: S$GLB,,, | Performed by: OTOLARYNGOLOGY

## 2019-07-30 NOTE — PROGRESS NOTES
Subjective:   Patient: Meg Goff [id number], :1960   Visit date:2019 10:20 AM    Chief Complaint:  surgery consult, infected sinus pocket, slight deviated sept    HPI:  Mge is a 59 y.o. female who is here for follow-up. She was last here in 2019 seen by Monique Ansari PA-C, for recurrent sinusitis. Pt reported chronic sinusitis since 2018 and received previous tx with Augmentin and Doxycycline from her PCP. W/u with CT was significant for L > R maxillary sinusitis and pt was tx with Levaquin and Flagyl x 21 days. Pt cx f/u appt in clinic after this. She contacted us in April c/o persistent symptoms and was tx with Augmentin and Medrol. She rtc on 19     The patient reports the following allergy/sinus symptoms (Negative unless checked off):   [] Sneezing   [x] Nasal congestion  [] Itchy nose  []  Rhinorrhea  [x] Watery eyes (left only)  [] Itchy eyes  [] Cough  [] Post-nasal drip  [x] Ear pressure  [x] Ear popping   [] Otorrhea  [x] Facial pressure  [] Asthma  [] Eczema  [] Other    Medications:  []Anti-histamines:   [] Allegra   [] Benadryl   [] Claritin   [] Xyzal   [] Zyrtec   [] other  [] Singulair  [] Nasal Sprays:   [] Astelin   [] Atrovent   [] Flonase   [] Nasacort   [] Nasonex   [] Other  [] SCIT  [] SLIT   [] Oral steroid  [] IM steroid  [] Antibiotics:   [] Amoxicillin   [x] Augmentin   [] Azithromycin   [] Bactrim   [] Cefdinir   [] Clindamycin   [x] Doxycycline   [x] Levaquin   [x] Other  [] Other    Severity: moderate    She has had 3 or 4 sinus infections in the past 12 months.     Allergy testing for this patient was not done.    Current smoker:  No    There has been a recent imaging study of the sinuses (none and CT sinuses).  Results for orders placed during the hospital encounter of 19   CT Sinuses without Contrast    Narrative EXAMINATION:  CT SINUSES WITHOUT CONTRAST    CLINICAL HISTORY:  Sinusitis, recurring, possible  surgery;.    TECHNIQUE:  Low dose axial images were obtained through the head.  Coronal and sagittal reformations were also performed. Contrast was not administered.    COMPARISON:  None.    FINDINGS:  Minimal chronic mucosal thickening identified circumferentially throughout the left maxillary antrum measuring approximately 1.0 mm in maximal with.  A small polyp or retention cyst is identified at the medial wall of the upper left maxillary antrum measuring 4.5 cm.  The left ostiomeatal complex is patent.    The right maxillary antrum demonstrates minimal chronic mucosal thickening in the inferior medial wall.  Mucosal thickening measures approximately 2 mm in maximal thickness.  Mild mucosal thickening identified at the ostia.  The ostia and infundibulum of the right ostiomeatal complex are patent.    Mild scattered mucosal thickening and patchy opacification of the ethmoid air cells bilaterally.  Mild chronic vessel thickening identified left frontoethmoidal recess.  The frontal sinuses are small in size but otherwise clear.  Sphenoid sinuses are clear.    Mild leftward nasal septal deviation is identified with associated spurring.  Middle and inferior nasal turbinates are normal.    The orbits and globes are normal.  Facial soft tissues are normal.  The visualized intracranial contents are normal.      Impression 1. Mild chronic mucosal thickening in the maxillary sinuses with small polyps or retention cysts in the left maxillary antrum.  2. Mild scattered chronic mucosal thickening and patchy opacification of the ethmoid sinuses.  3. Mild leftward nasal septal deviation.  All CT scans at this facility are performed  using dose modulation techniques as appropriate to performed exam including the following:  automated exposure control; adjustment of mA and/or kV according to the patients size (this includes techniques or standardized protocols for targeted exams where dose is matched to indication/reason for exam:  "i.e. extremities or head);  iterative reconstruction technique.      Electronically signed by: Armando Navarro MD  Date:    2019  Time:    09:59             Review of Systems:  -     Allergic/Immunologic: is allergic to gluten protein and sulfur..  -     Constitutional: Current temp: 97.4 °F (36.3 °C) (Tympanic)    Her meds, allergies, medical, surgical, social & family histories were reviewed & updated:  -     She has a current medication list which includes the following prescription(s): pseudoephedrine-ibuprofen.  -     She  has a past medical history of Celiac disease, DDD (degenerative disc disease), cervical, Duodenitis, Frozen shoulder syndrome, H. pylori infection, and Menopausal symptoms.   -     She does not have any pertinent problems on file.   -     She  has a past surgical history that includes  section; Tubal ligation; Esophagogastroduodenoscopy (10/5510550); Colonoscopy; and Colonoscopy (N/A, 2016).  -     She  reports that she has never smoked. She has never used smokeless tobacco. She reports that she does not drink alcohol or use drugs.  -     Her family history includes Anuerysm in her father; Heart failure in her father; Lung cancer in her mother; Seizures in her paternal aunt.  -     She is allergic to gluten protein and sulfur.    Objective:     Physical Exam:  Vitals:  /77 (BP Location: Left arm, Patient Position: Sitting, BP Method: Small (Automatic))   Pulse 69   Temp 97.4 °F (36.3 °C) (Tympanic)   Resp 16   Ht 5' 4" (1.626 m)   Wt 71.1 kg (156 lb 12 oz)   BMI 26.91 kg/m²   Appearance:  Well-developed, well-nourished.  Communication:  Able to communicate, no hoarseness.  Head & Face:  Normocephalic, atraumatic, no sinus tenderness, normal facial strength.  Eyes:  Extraocular motions intact.  Ears:  Otoscopy of external auditory canals and tympanic membranes was normal, clinical speech reception thresholds grossly intact, no mass/lesion of auricle.  Nose:  " No masses/lesions of external nose, nasal mucosa, septum, and turbinates were within normal limits.  Mouth:  No mass/lesion of lips, teeth, gums, hard/soft palate, tongue, tonsils, or oropharynx.  Neck & Lymphatics:  No cervical lymphadenopathy, no neck mass/crepitus/ asymmetry, trachea is midline, no thyroid enlargement/tenderness/mass.  Neuro/Psych: Alert with normal mood and affect.   Abdominal: Normal appearance.   Respiration/Chest:  Symmetric expansion during respiration, normal respiratory effort.  Skin:  Warm and intact  Cardiovascular:  No peripheral vascular edema or varicosities.    CT SINUSES WITHOUT CONTRAST    CLINICAL HISTORY:  Sinusitis, recurring, possible surgery;.    TECHNIQUE:  Low dose axial images were obtained through the head.  Coronal and sagittal reformations were also performed. Contrast was not administered.    COMPARISON:  None.    FINDINGS:  Minimal chronic mucosal thickening identified circumferentially throughout the left maxillary antrum measuring approximately 1.0 mm in maximal with.  A small polyp or retention cyst is identified at the medial wall of the upper left maxillary antrum measuring 4.5 cm.  The left ostiomeatal complex is patent.    The right maxillary antrum demonstrates minimal chronic mucosal thickening in the inferior medial wall.  Mucosal thickening measures approximately 2 mm in maximal thickness.  Mild mucosal thickening identified at the ostia.  The ostia and infundibulum of the right ostiomeatal complex are patent.    Mild scattered mucosal thickening and patchy opacification of the ethmoid air cells bilaterally.  Mild chronic vessel thickening identified left frontoethmoidal recess.  The frontal sinuses are small in size but otherwise clear.  Sphenoid sinuses are clear.    Mild leftward nasal septal deviation is identified with associated spurring.  Middle and inferior nasal turbinates are normal.    The orbits and globes are normal.  Facial soft tissues are  normal.  The visualized intracranial contents are normal.      Impression       1. Mild chronic mucosal thickening in the maxillary sinuses with small polyps or retention cysts in the left maxillary antrum.  2. Mild scattered chronic mucosal thickening and patchy opacification of the ethmoid sinuses.  3. Mild leftward nasal septal deviation.  All CT scans at this facility are performed  using dose modulation techniques as appropriate to performed exam including the following:  automated exposure control; adjustment of mA and/or kV according to the patients size (this includes techniques or standardized protocols for targeted exams where dose is matched to indication/reason for exam: i.e. extremities or head);  iterative reconstruction technique.         Assessment & Plan:   Meg was seen today for surgery consult, infected sinus pocket, slight deviated sept.    Diagnoses and all orders for this visit:    Dysfunction of both eustachian tubes    Nasal turbinate hypertrophy      Headaches and congestion are primary complaints.  Headaches are more likely migrainous than sinonasal.  Recommend Flonase.  Call if not improving after consistent use 3-4 weeks.             Ministerio Wilder MD

## 2019-07-31 ENCOUNTER — PATIENT MESSAGE (OUTPATIENT)
Dept: OTOLARYNGOLOGY | Facility: CLINIC | Age: 59
End: 2019-07-31

## 2019-07-31 RX ORDER — FLUTICASONE PROPIONATE 50 MCG
2 SPRAY, SUSPENSION (ML) NASAL DAILY
Qty: 1 BOTTLE | Refills: 12 | Status: SHIPPED | OUTPATIENT
Start: 2019-07-31 | End: 2022-02-02 | Stop reason: SDUPTHER

## 2019-11-06 ENCOUNTER — OFFICE VISIT (OUTPATIENT)
Dept: FAMILY MEDICINE | Facility: CLINIC | Age: 59
End: 2019-11-06
Payer: COMMERCIAL

## 2019-11-06 ENCOUNTER — LAB VISIT (OUTPATIENT)
Dept: LAB | Facility: HOSPITAL | Age: 59
End: 2019-11-06
Attending: FAMILY MEDICINE
Payer: COMMERCIAL

## 2019-11-06 VITALS
HEART RATE: 64 BPM | WEIGHT: 155.63 LBS | BODY MASS INDEX: 26.57 KG/M2 | TEMPERATURE: 99 F | SYSTOLIC BLOOD PRESSURE: 135 MMHG | HEIGHT: 64 IN | DIASTOLIC BLOOD PRESSURE: 75 MMHG

## 2019-11-06 DIAGNOSIS — L50.8 CHRONIC URTICARIA: ICD-10-CM

## 2019-11-06 DIAGNOSIS — Z13.220 ENCOUNTER FOR LIPID SCREENING FOR CARDIOVASCULAR DISEASE: ICD-10-CM

## 2019-11-06 DIAGNOSIS — K90.0 CELIAC DISEASE: ICD-10-CM

## 2019-11-06 DIAGNOSIS — Z13.6 ENCOUNTER FOR LIPID SCREENING FOR CARDIOVASCULAR DISEASE: ICD-10-CM

## 2019-11-06 DIAGNOSIS — F32.9 MAJOR DEPRESSIVE DISORDER WITH CURRENT ACTIVE EPISODE, UNSPECIFIED DEPRESSION EPISODE SEVERITY, UNSPECIFIED WHETHER RECURRENT: ICD-10-CM

## 2019-11-06 DIAGNOSIS — L50.8 CHRONIC URTICARIA: Primary | ICD-10-CM

## 2019-11-06 LAB
ALBUMIN SERPL BCP-MCNC: 4 G/DL (ref 3.5–5.2)
ALP SERPL-CCNC: 81 U/L (ref 55–135)
ALT SERPL W/O P-5'-P-CCNC: 13 U/L (ref 10–44)
ANION GAP SERPL CALC-SCNC: 7 MMOL/L (ref 8–16)
AST SERPL-CCNC: 15 U/L (ref 10–40)
BASOPHILS # BLD AUTO: 0.05 K/UL (ref 0–0.2)
BASOPHILS NFR BLD: 0.7 % (ref 0–1.9)
BILIRUB SERPL-MCNC: 0.3 MG/DL (ref 0.1–1)
BUN SERPL-MCNC: 18 MG/DL (ref 6–20)
CALCIUM SERPL-MCNC: 9.6 MG/DL (ref 8.7–10.5)
CHLORIDE SERPL-SCNC: 108 MMOL/L (ref 95–110)
CHOLEST SERPL-MCNC: 170 MG/DL (ref 120–199)
CHOLEST/HDLC SERPL: 2.7 {RATIO} (ref 2–5)
CO2 SERPL-SCNC: 27 MMOL/L (ref 23–29)
CREAT SERPL-MCNC: 0.9 MG/DL (ref 0.5–1.4)
DIFFERENTIAL METHOD: ABNORMAL
EOSINOPHIL # BLD AUTO: 0.3 K/UL (ref 0–0.5)
EOSINOPHIL NFR BLD: 3.4 % (ref 0–8)
ERYTHROCYTE [DISTWIDTH] IN BLOOD BY AUTOMATED COUNT: 14 % (ref 11.5–14.5)
EST. GFR  (AFRICAN AMERICAN): >60 ML/MIN/1.73 M^2
EST. GFR  (NON AFRICAN AMERICAN): >60 ML/MIN/1.73 M^2
GLUCOSE SERPL-MCNC: 88 MG/DL (ref 70–110)
HCT VFR BLD AUTO: 41.9 % (ref 37–48.5)
HDLC SERPL-MCNC: 63 MG/DL (ref 40–75)
HDLC SERPL: 37.1 % (ref 20–50)
HGB BLD-MCNC: 13.1 G/DL (ref 12–16)
IMM GRANULOCYTES # BLD AUTO: 0.02 K/UL (ref 0–0.04)
IMM GRANULOCYTES NFR BLD AUTO: 0.3 % (ref 0–0.5)
LDLC SERPL CALC-MCNC: 91 MG/DL (ref 63–159)
LYMPHOCYTES # BLD AUTO: 2.8 K/UL (ref 1–4.8)
LYMPHOCYTES NFR BLD: 37.3 % (ref 18–48)
MCH RBC QN AUTO: 28.5 PG (ref 27–31)
MCHC RBC AUTO-ENTMCNC: 31.3 G/DL (ref 32–36)
MCV RBC AUTO: 91 FL (ref 82–98)
MONOCYTES # BLD AUTO: 0.6 K/UL (ref 0.3–1)
MONOCYTES NFR BLD: 7.9 % (ref 4–15)
NEUTROPHILS # BLD AUTO: 3.8 K/UL (ref 1.8–7.7)
NEUTROPHILS NFR BLD: 50.4 % (ref 38–73)
NONHDLC SERPL-MCNC: 107 MG/DL
NRBC BLD-RTO: 0 /100 WBC
PLATELET # BLD AUTO: 276 K/UL (ref 150–350)
PMV BLD AUTO: 12.9 FL (ref 9.2–12.9)
POTASSIUM SERPL-SCNC: 4.6 MMOL/L (ref 3.5–5.1)
PROT SERPL-MCNC: 7.2 G/DL (ref 6–8.4)
RBC # BLD AUTO: 4.6 M/UL (ref 4–5.4)
SODIUM SERPL-SCNC: 142 MMOL/L (ref 136–145)
TRIGL SERPL-MCNC: 80 MG/DL (ref 30–150)
TSH SERPL DL<=0.005 MIU/L-ACNC: 2.14 UIU/ML (ref 0.4–4)
WBC # BLD AUTO: 7.43 K/UL (ref 3.9–12.7)

## 2019-11-06 PROCEDURE — 3008F PR BODY MASS INDEX (BMI) DOCUMENTED: ICD-10-PCS | Mod: CPTII,S$GLB,, | Performed by: FAMILY MEDICINE

## 2019-11-06 PROCEDURE — 80061 LIPID PANEL: CPT

## 2019-11-06 PROCEDURE — 84443 ASSAY THYROID STIM HORMONE: CPT

## 2019-11-06 PROCEDURE — 99999 PR PBB SHADOW E&M-EST. PATIENT-LVL III: ICD-10-PCS | Mod: PBBFAC,,, | Performed by: FAMILY MEDICINE

## 2019-11-06 PROCEDURE — 99214 OFFICE O/P EST MOD 30 MIN: CPT | Mod: S$GLB,,, | Performed by: FAMILY MEDICINE

## 2019-11-06 PROCEDURE — 99999 PR PBB SHADOW E&M-EST. PATIENT-LVL III: CPT | Mod: PBBFAC,,, | Performed by: FAMILY MEDICINE

## 2019-11-06 PROCEDURE — 80053 COMPREHEN METABOLIC PANEL: CPT

## 2019-11-06 PROCEDURE — 83516 IMMUNOASSAY NONANTIBODY: CPT

## 2019-11-06 PROCEDURE — 99214 PR OFFICE/OUTPT VISIT, EST, LEVL IV, 30-39 MIN: ICD-10-PCS | Mod: S$GLB,,, | Performed by: FAMILY MEDICINE

## 2019-11-06 PROCEDURE — 3008F BODY MASS INDEX DOCD: CPT | Mod: CPTII,S$GLB,, | Performed by: FAMILY MEDICINE

## 2019-11-06 PROCEDURE — 36415 COLL VENOUS BLD VENIPUNCTURE: CPT | Mod: PO

## 2019-11-06 PROCEDURE — 85025 COMPLETE CBC W/AUTO DIFF WBC: CPT

## 2019-11-06 RX ORDER — CETIRIZINE HYDROCHLORIDE 10 MG/1
10 TABLET ORAL NIGHTLY
Refills: 0 | COMMUNITY
Start: 2019-11-06 | End: 2022-10-21

## 2019-11-06 RX ORDER — FAMOTIDINE 20 MG/1
20 TABLET, FILM COATED ORAL 2 TIMES DAILY
Qty: 180 TABLET | Refills: 3 | Status: SHIPPED | OUTPATIENT
Start: 2019-11-06 | End: 2020-03-02

## 2019-11-06 RX ORDER — DIPHENHYDRAMINE HCL 25 MG
25 CAPSULE ORAL EVERY 4 HOURS PRN
COMMUNITY
Start: 2019-10-15 | End: 2020-03-02

## 2019-11-06 RX ORDER — SERTRALINE HYDROCHLORIDE 50 MG/1
TABLET, FILM COATED ORAL
Qty: 30 TABLET | Refills: 5 | Status: SHIPPED | OUTPATIENT
Start: 2019-11-06 | End: 2021-08-23

## 2019-11-07 NOTE — PROGRESS NOTES
Complains of feeling depressed.  She has had a lot of stress, family issues.  S felt overwhelmed.  No SI/HI.  She has taken antidepressants in the past, but only intermittently and as needed, never full treatment.  She has had some increase in her chronic urticaria recently which he relates to this.  She has had some intermittent hives.  Previous allergy evaluation.  Currently using p.r.n. Benadryl only.  Symptoms few months.  Celiac disease states states compliance with her diet      Diagnoses and all orders for this visit:    Chronic urticaria  -     CBC auto differential; Future  -     Comprehensive metabolic panel; Future  -     TSH; Future    Celiac disease  -     Tissue transglutaminase, IgA; Future    Major depressive disorder with current active episode, unspecified depression episode severity, unspecified whether recurrent    Encounter for lipid screening for cardiovascular disease  -     Lipid panel; Future    Other orders  -     sertraline (ZOLOFT) 50 MG tablet; Take 1/2 po daily for 1 week, then take 1 daily  -     cetirizine (ZYRTEC) 10 MG tablet; Take 1 tablet (10 mg total) by mouth every evening.  -     famotidine (PEPCID) 20 MG tablet; Take 1 tablet (20 mg total) by mouth 2 (two) times daily.      Follow-up 1 month            Past Medical History:  Past Medical History:   Diagnosis Date    Celiac disease     DDD (degenerative disc disease), cervical     Duodenitis     Frozen shoulder syndrome     H. pylori infection     Menopausal symptoms      Past Surgical History:   Procedure Laterality Date     SECTION      COLONOSCOPY      COLONOSCOPY N/A 2016    Procedure: COLONOSCOPY;  Surgeon: Roberta Boateng MD;  [facility];  Service: Endoscopy;  Laterality: N/A;    ESOPHAGOGASTRODUODENOSCOPY  10/[id number]    TUBAL LIGATION       Review of patient's allergies indicates:   Allergen Reactions    Gluten protein     Sulfur      Current Outpatient Medications on File Prior to  Visit   Medication Sig Dispense Refill    diphenhydrAMINE (BENADRYL) 25 mg capsule Take 25 mg by mouth every 4 (four) hours as needed.       fluticasone propionate (FLONASE) 50 mcg/actuation nasal spray 2 sprays (100 mcg total) by Each Nostril route once daily. 1 Bottle 12    pseudoephedrine-ibuprofen (ADVIL COLD AND SINUS)  mg Tab Take by mouth daily as needed.       No current facility-administered medications on file prior to visit.      Social History     Socioeconomic History    Marital status:      Spouse name: Not on file    Number of children: Not on file    Years of education: Not on file    Highest education level: Not on file   Occupational History    Not on file   Social Needs    Financial resource strain: Not on file    Food insecurity:     Worry: Not on file     Inability: Not on file    Transportation needs:     Medical: Not on file     Non-medical: Not on file   Tobacco Use    Smoking status: Never Smoker    Smokeless tobacco: Never Used   Substance and Sexual Activity    Alcohol use: No    Drug use: No    Sexual activity: Not on file   Lifestyle    Physical activity:     Days per week: Not on file     Minutes per session: Not on file    Stress: Not on file   Relationships    Social connections:     Talks on phone: Not on file     Gets together: Not on file     Attends [employer] service: Not on file     Active member of club or organization: Not on file     Attends meetings of clubs or organizations: Not on file     Relationship status: Not on file   Other Topics Concern    Not on file   Social History Narrative    Not on file     Family History   Problem Relation Age of Onset    Lung cancer Mother     Heart failure Father     Anuerysm Father         abd    Seizures Paternal Aunt     Allergic rhinitis Neg Hx     Asthma Neg Hx     Atopy Neg Hx     Eczema Neg Hx     Immunodeficiency Neg Hx     Rhinitis Neg Hx     Urticaria Neg Hx     Angioedema Neg Hx      "Allergies Neg Hx            ROS:  GENERAL: No fever, chills,  or significant weight changes.   CARDIOVASCULAR: Denies chest pain, PND, orthopnea or reduced exercise tolerance.  ABDOMEN: Appetite fine. Denies diarrhea, abdominal pain, hematemesis or blood in stool.  URINARY: No flank pain, dysuria or hematuria.    Vitals:    11/06/19 1014   BP: 135/75   Pulse: 64   Temp: 98.7 °F (37.1 °C)   Weight: 70.6 kg (155 lb 9.6 oz)   Height: 5' 4" (1.626 m)     Wt Readings from Last 3 Encounters:   11/06/19 70.6 kg (155 lb 9.6 oz)   07/30/19 71.1 kg (156 lb 12 oz)   02/01/19 73.2 kg (161 lb 6 oz)       OBJECTIVE:   APPEARANCE: Well nourished, well developed, in no acute distress.    HEAD: Normocephalic.  Atraumatic.  No sinus tenderness.  EYES:   Right eye: Pupil reactive.  Conjunctiva clear.    Left eye: Pupil reactive.  Conjunctiva clear.  EOMI.    EARS: TM's intact. Light reflex normal. No retraction or perforation.    NOSE:  clear.  MOUTH & THROAT:  No pharyngeal erythema or exudate. No lesions.  NECK: Supple. No bruits.  No JVD.  No cervical lymphadenopathy.  No thyromegaly.    CHEST: Breath sounds clear bilaterally.  Normal respiratory effort  CARDIOVASCULAR: Normal rate.  Regular rhythm.  No murmurs.  No rub.  No gallops.  ABDOMEN: Bowel sounds normal.  Soft.  No tenderness.  No organomegaly.  PERIPHERAL VASCULAR: No cyanosis.  No clubbing.  No edema.  NEUROLOGIC: No focal findings.  MENTAL STATUS: Alert.  Oriented x 3.        "

## 2019-11-08 LAB — TTG IGA SER-ACNC: 7 UNITS

## 2020-02-27 ENCOUNTER — PATIENT MESSAGE (OUTPATIENT)
Dept: FAMILY MEDICINE | Facility: CLINIC | Age: 60
End: 2020-02-27

## 2020-02-27 DIAGNOSIS — R19.7 DIARRHEA, UNSPECIFIED TYPE: Primary | ICD-10-CM

## 2020-03-02 ENCOUNTER — PATIENT MESSAGE (OUTPATIENT)
Dept: GASTROENTEROLOGY | Facility: CLINIC | Age: 60
End: 2020-03-02

## 2020-03-02 ENCOUNTER — HOSPITAL ENCOUNTER (OUTPATIENT)
Dept: RADIOLOGY | Facility: HOSPITAL | Age: 60
Discharge: HOME | End: 2020-03-02
Attending: INTERNAL MEDICINE
Payer: COMMERCIAL

## 2020-03-02 ENCOUNTER — OFFICE VISIT (OUTPATIENT)
Dept: GASTROENTEROLOGY | Facility: CLINIC | Age: 60
End: 2020-03-02
Payer: COMMERCIAL

## 2020-03-02 ENCOUNTER — PATIENT OUTREACH (OUTPATIENT)
Dept: ADMINISTRATIVE | Facility: OTHER | Age: 60
End: 2020-03-02

## 2020-03-02 VITALS
WEIGHT: 151 LBS | BODY MASS INDEX: 25.78 KG/M2 | SYSTOLIC BLOOD PRESSURE: 114 MMHG | DIASTOLIC BLOOD PRESSURE: 78 MMHG | HEIGHT: 64 IN | HEART RATE: 56 BPM

## 2020-03-02 DIAGNOSIS — K90.9 DIARRHEA DUE TO MALABSORPTION: ICD-10-CM

## 2020-03-02 DIAGNOSIS — R19.7 DIARRHEA DUE TO MALABSORPTION: ICD-10-CM

## 2020-03-02 DIAGNOSIS — K90.0 CELIAC DISEASE: ICD-10-CM

## 2020-03-02 DIAGNOSIS — K90.0 CELIAC DISEASE: Primary | ICD-10-CM

## 2020-03-02 DIAGNOSIS — Z12.31 ENCOUNTER FOR SCREENING MAMMOGRAM FOR MALIGNANT NEOPLASM OF BREAST: Primary | ICD-10-CM

## 2020-03-02 PROCEDURE — 99999 PR PBB SHADOW E&M-EST. PATIENT-LVL IV: CPT | Mod: PBBFAC,,, | Performed by: INTERNAL MEDICINE

## 2020-03-02 PROCEDURE — 71045 XR CHEST 1 VIEW: ICD-10-PCS | Mod: 26,,, | Performed by: RADIOLOGY

## 2020-03-02 PROCEDURE — 99999 PR PBB SHADOW E&M-EST. PATIENT-LVL IV: ICD-10-PCS | Mod: PBBFAC,,, | Performed by: INTERNAL MEDICINE

## 2020-03-02 PROCEDURE — 99244 OFF/OP CNSLTJ NEW/EST MOD 40: CPT | Mod: S$GLB,,, | Performed by: INTERNAL MEDICINE

## 2020-03-02 PROCEDURE — 71045 X-RAY EXAM CHEST 1 VIEW: CPT | Mod: 26,,, | Performed by: RADIOLOGY

## 2020-03-02 PROCEDURE — 99244 PR OFFICE CONSULTATION,LEVEL IV: ICD-10-PCS | Mod: S$GLB,,, | Performed by: INTERNAL MEDICINE

## 2020-03-02 PROCEDURE — 71045 X-RAY EXAM CHEST 1 VIEW: CPT | Mod: TC

## 2020-03-02 NOTE — LETTER
March 2, 2020      Pablo Ng MD  [address]  [place] [address]           [facility]  [address] [place] [id number]  Phone: [phone]  Fax: [phone]          Patient: Meg Goff   MR Number: [id number]   YOB: 1960   Date of Visit: 3/2/2020       Dear Dr. Pablo Ng:    Thank you for referring Meg Goff to me for evaluation. Attached you will find relevant portions of my assessment and plan of care.    If you have questions, please do not hesitate to call me. I look forward to following Meg Goff along with you.    Sincerely,    Ganga Dejesus MD    Enclosure  CC:  No Recipients    If you would like to receive this communication electronically, please contact [email or web address] or [phone] to request more information on [facility] Link access.    For providers and/or their staff who would like to refer a patient to [facility], please contact us through our one-stop-shop provider referral line, [facility], at [phone].    If you feel you have received this communication in error or would no longer like to receive these types of communications, please e-mail [email or web address]

## 2020-03-02 NOTE — PROGRESS NOTES
"1. Have you been admitted overnight to the hospital in the past 3 months? no   If yes, schedule an appointment with PCP before scheduling endoscopic procedure.     2. Have you had a stent placed in the last 12 months? no   If yes, for a screening visit, cancel and message the ordering provider.  The patient will need a new order when the time is appropriate.     3. Have you had a stroke or heart attack in the past 6 months? no   If yes, cancel and refer patient to ordering provider for clearance, also message ordering provider to inform.     4. Have you had any chest pain in the past 3 months? no   If yes, Have you been evaluated by your PCP and/or cardiologist and it was determined to not be heart related? not applicable   If No, Pt needs to be seen by PCP or Cardiologist .  Pt can be scheduled once clearance obtained by either of those providers.     5. Do you take prescription weight loss medications?  no   If yes, must stop for 2 weeks prior to procedure.     6. Have you been diagnosed with diverticulitis within the past 3 months? no   If yes, must have been seen by GI within the last 3 months, if not schedule with GI [facility].    If pt has been seen by GI, schedule procedure 8-12 weeks post antibiotic treatment.     7. Are you on Dialysis? no  If yes, schedule procedure for the day AFTER dialysis.  Appt time should be 9am or later, patient arrival time is 2 hours prior.  Nulytely or miralax prep for all patients with kidney disease.     8. Are you diabetic?  no   If yes, schedule morning appt. Advise pt to hold all diabetic meds day of procedure.     9. If pt is older than 80 years of age and HAS NOT been seen by GI or PCP within the last 6 months, needs appt with GI [facility].   If pt has been seen by the GI provider or PCP within the past 6  months AND meets criteria, schedule procedure AND send message to the endoscopist.     10. Is patient on a "high risk" medication (blood thinner/antiplatelet agent)?  no   If yes, " has cardiac clearance been obtained within the last 60 days? N/A   If no, a new clearance needs to be obtained.       I have reviewed the last colonoscopy for recommendations regarding next procedure bowel prep.  yes  I have reviewed medications and allergies.  yes  I have verified the pharmacy information and appropriate prep sent if needed. yes  Prep instructions have been mailed or sent to portal per patient request. no handed to pt while in the clinic    If answers yes to any of the following, schedule at [facility] ONLY. If No, OK for either location.     Is BMI over 45?  25  Any complaints of chest pain, new onset or at rest? No  Does pt have an AICD? no  Is there a diagnosis of heart failure? no  Is patient on dialysis? no  Does patient have an insulin pump? no  If procedure for esophageal banding? no

## 2020-03-02 NOTE — PATIENT INSTRUCTIONS
Thanks for trusting us with your healthcare needs and using [facility]. If you want to ask us a question, you can do so by replying to this message or by calling [phone].    Sincerely,    Ganga Dejesus M.D.      To rate your experience with Dr. Dejesus please click on the link below:    [email or web address]          Celiac Disease     With celiac disease, villi inside the small intestine become damaged and cannot absorb nutrients properly.     Celiac disease is caused by a sensitivity or allergy to gluten. This is a protein found in many grains such as wheat, barley, and rye. Celiac disease affects villi (tiny, fingerlike stalks) in the small bowel (intestine). Normally, the villi make it possible for the small bowel to absorb nutrients from the food you eat. But celiac disease damages the villi. As a result, you cant absorb the nutrients you need, even if you eat plenty of food. Celiac disease is an autoimmune disease. You can manage the disease by removing gluten from your diet. This relieves your symptoms. It also reverses the damage to your small bowel. Celiac disease is sometimes called celiac sprue.  Causes of celiac disease  Celiac disease may have a genetic component. This means it can be passed down in families. If your healthcare provider thinks that you have celiac disease, he or she may advise that other members of your family be checked for it as well.  Signs and symptoms of celiac disease  The symptoms of celiac disease can vary for each person. Some people have no symptoms at all. If symptoms do happen, they can include:  · Diarrhea, constipation, or both  · Light colored, foul-smelling or fatty stool  · Belly pain and cramping  · Belly swelling or bloating  · Weight loss  · Bone or joint pain  · Iron deficiency  · Headaches  · Tiredness and loss of energy  · Mood changes, irritability, and depression  · Infertility  · Unexplained elevated liver  tests  · Canker sores  · Skin rash  · Tooth enamel problems  Diagnosing celiac disease  Your healthcare provider will ask about your symptoms and health history. Youll also have a physical exam. Tests are then done to confirm the problem. These can include:  · Blood tests. These help check for specific proteins in the blood that are present with celiac disease. They also check for anemia and help rule out other problems. The tests are done by taking a blood sample.  · Upper endoscopy with biopsy. This is done to see inside the stomach and duodenum (first part of the small bowel). For the test, an endoscope is used. This is a thin, flexible tube with a tiny camera on the end. Its inserted through the mouth and down into the stomach and duodenum. Tools are passed through the endoscope to remove tiny tissue samples (biopsy). The tissue samples are taken to a lab and looked at under a microscope. This is to check the tiny villi for damage. This test must be done while you are still eating food with gluten. This is the only way to see whether the presence of gluten is damaging the villi.  · Genetic tests. These check for problems with specific genes linked to celiac disease. They are done by taking blood samples.  Treating celiac disease  To treat celiac disease, you must remove all sources of gluten from your diet. This will allow the villi to heal, so that nutrients can be absorbed normally. Its important to follow a strict, gluten-free diet daily, even if you dont have symptoms. If you dont do this, the small bowel can become permanently damaged, which can lead to serious health problems. These include bone disease, cancer of the small bowel, and various nervous system disorders.  Sources of gluten  Gluten is found in wheat, barley, and rye. The most common foods with gluten are those made with wheat flour. These include bread, pasta, cake, and cereal. Gluten is also often found in beer, gravies, salad dressings,  "and most packaged foods. It is even found in some nonfood products, such as certain medicines and cosmetics. Your healthcare provider can refer you to a dietitian to  you about what you should avoid. The resources below will also give you lists of food and products that contain gluten.  Follow-up  Youll meet with your healthcare provider periodically to monitor your health. During these visits, routine blood tests are often done to make sure your condition is under control. Your healthcare provider can also refer you to other healthcare providers or support and advocacy groups to help you cope with your condition.  Learning more about celiac disease  The following resources can help you learn more about celiac disease and how to manage it:  · Celiac Disease Foundation, www.celiac.org  · Celiac Sprue Association, www.csaceliacs.org  · Gluten Intolerance Group, www.gluten.net  · National [place] of Diabetes and Digestive and Kidney Diseases, www2.niddk.nih.gov   Date Last Reviewed: 7/1/2016  © 0694-6458 [employer]. [address] [place] [address], [place] [address]. All rights reserved. This information is not intended as a substitute for professional medical care. Always follow your healthcare professional's instructions.        Gluten-Free Diet for Celiac Disease     Reading food labels will help you avoid foods with gluten.   You've been told that you have celiac disease. This means that you are sensitive to a protein called gluten. Gluten is found in certain grains. When you ingest gluten, your immune system causes harm to your small intestines. The treatment for celiac disease is to avoid foods and products that contain gluten. You will need to do this for the rest of your life. Avoid the temptation to "cheat," even a small amount of gluten can cause symptoms to return. And it can harm your body. This sheet gives you the basics about a gluten-free diet. If you need help, a registered dietitian " "(RD) can teach you what foods and other products have gluten and how to avoid them.  Always read labels!  Many foods may contain gluten, even if you think they don't. Get into the habit of reading ingredient labels before you eat.   Choosing foods  The most common source of gluten is wheat flour (this includes "white" flour). Wheat flour is used to make many baked goods, including breads, pastas, cereals, pastries, and pizza dough. But gluten is also found in many foods that you might not think would have it. You will need to read food labels to look for gluten in everything you eat. But your diet does not need to be boring. Many foods are naturally gluten-free. And many foods commonly made with wheat flour now come in gluten-free forms. But keep in mind that if something is labeled "wheat-free" it may not be also gluten-free.  Foods to Avoid Foods You Can Eat   Bread, cereals, pasta, pastries, couscous, or pizza dough made with wheat flour (this includes white flour, farina, farro, emmer, durum, rahul, and semolina) Bread, cereals, pasta, pastries, or pizza dough made with rice flour, almond flour, beans, potatoes, and other gluten-free substitutes   Foods containing rye, barley (including malt), spelt, kamut, triticale, simon's yeast, and bulgur Foods containing corn, cassava, rice, amaranth, buckwheat, millet, quinoa, arrowroot, teff, soy, and tapioca   Processed meats Fresh meats and seafood (beef, chicken, turkey, lamb, pork, fish, shellfish), beans, and tofu   Some dairy products with additives Many plain dairy products   Many sauces, gravies, dressings, and condiments, including traditional soy sauce Vinegar, oils, and gluten-free substitutes   Some granola bars and energy bars Gluten-free granola bars and energy bars   Some beers and spirits Wine, and gluten-free beers and distilled spirits   Some soups Gluten-free soups   Fruits and vegetables that are fried or breaded Fresh fruits and vegetables   Many " "packaged foods Packaged foods labeled "gluten-free"   Oats (check with your healthcare provider) Gluten-free oats   Communion wafers Gluten-free communion wafers   Avoiding accidental exposure to gluten  Staying gluten-free means always being aware. Even if you are very careful, mistakes can happen. The food you eat can't come into contact with gluten. Your meals must be made with utensils that have not touched foods that contain gluten. Shared knives, cutting boards, toasters, and storage containers are risks for gluten exposure. Shared condiments may have crumbs that contain gluten. At restaurants, parties, and other places where you eat food prepared by others, ask how the food was made. Gluten can also be found in some non-food items. Some medications contain gluten. So do some vitamin supplements. Ask your pharmacist before taking a medication or supplement. Also, some shampoos, lotions, toothpastes, makeup, lipstick (lip gloss and lip balm), glues, soaps, and other products contain gluten. It can be possible to ingest some gluten when using these projects. This is called cross-contamination. For example, this can happen if you use a lotion that has gluten and then touch food you eat. Also note that Play-Lorena and similar products have gluten. Any adult or child with celiac disease should wash their hands after handling these.  Coping with gluten-free living  Living gluten-free can be hard. While there are many gluten-free foods now that you can buy, it is still a big change for many people. You may be upset that you can't eat your favorite foods, eat freely at restaurants, parties, or over the holidays. Household members may also be upset by the strict controls over food. If you face problems like these, think about joining a celiac disease support group. Support groups offer tips on how to make a gluten-free lifestyle easier on you and the people you live with. You can find ways to involve the people in your " household. There are many ways to make gluten-free group meals. See More Resources below for help in finding a group.  Bring safe foods that you enjoy to parties and school or work events. This can help you avoid the urge to grab something you shouldnt eat.   Following up with your health care provider  You should see your health care provider at least once a year for a celiac checkup. A simple blood test can show if your celiac disease is under control. If you are having symptoms, your health care provider can help you find sources of gluten you may have missed.  More resources  To learn more about managing celiac disease, try these resources:  · Celiac Disease Foundation  www.celiac.org  · Academy of Nutrition and Dietetics  www.eatright.org  · National Digestive Diseases Information Clearinghouse  www.digestive.niddk.nih.gov  Date Last Reviewed: 6/19/2015  © 8515-2987 [employer]. [address] [place] [address]. All rights reserved. This information is not intended as a substitute for professional medical care. Always follow your healthcare professional's instructions.    Celiac disease  -     MRI Abdomen-Pelvis w w/o Contrast (XPD); Future; Expected date: 03/03/2020  -     Case request GI: ESOPHAGOGASTRODUODENOSCOPY (EGD)  -     Celiac Disease Panel; Future; Expected date: 03/02/2020  -     Ambulatory referral/consult to Nutrition Services; Future; Expected date: 03/09/2020  -     X-Ray Chest 1 View; Future; Expected date: 03/02/2020  -     Basic metabolic panel; Future; Expected date: 03/02/2020  -     Protime-INR; Future; Expected date: 03/02/2020  -     CBC auto differential; Future; Expected date: 03/02/2020  -     TSH; Future; Expected date: 03/02/2020    Diarrhea due to malabsorption  -     Ambulatory referral/consult to Gastroenterology

## 2020-03-02 NOTE — PROGRESS NOTES
[facility]  Gastroenterology Note    Patient referred at the request of:  Pablo Ng [place]  [id number] [place] [address]    Subjective:       Patient ID: Meg Goff is a 60 y.o. female.    Chief Complaint: Diarrhea (hx celiac disease)    Mrs. Goff is a 60 year old  female who was diagnosed with celiac disease in 2016. She had a positive TTG IgA in  and recently has turned to normal values, after partially adhering to a celiac diet. In 2016 and EGD with biopsies showed evidence of villous blunting and a colonoscopy with random biopsies was essentially normal, except for diverticulosis. Now she presents with bouts of recurring diarrhea, urgency and sometimes loss of sphincter control. This does not happen often.     Diarrhea    This is a recurrent problem. The current episode started more than 1 year ago. The problem occurs 2 to 4 times per day. The problem has been waxing and waning. The stool consistency is described as mucous and watery. The patient states that diarrhea does not awaken her from sleep. Pertinent negatives include no abdominal pain, arthralgias, bloating, chills, coughing, fever, headaches, increased  flatus, myalgias, sweats, URI, vomiting or weight loss. The symptoms are aggravated by rye/wheat and stress. There are no known risk factors. She has tried change of diet for the symptoms. The treatment provided moderate relief. Her past medical history is significant for malabsorption. There is no history of bowel resection, inflammatory bowel disease, irritable bowel syndrome, a recent abdominal surgery or short gut syndrome.     Past Medical History:   Diagnosis Date    Celiac disease     DDD (degenerative disc disease), cervical     Duodenitis     Frozen shoulder syndrome     H. pylori infection     Menopausal symptoms      Past Surgical History:   Procedure Laterality Date     SECTION      COLONOSCOPY      COLONOSCOPY N/A 2016     Procedure: COLONOSCOPY;  Surgeon: Roberta Boateng MD;  Location[facility];  Service: Endoscopy;  Laterality: N/A;    ESOPHAGOGASTRODUODENOSCOPY  [id number]    TUBAL LIGATION       Current Outpatient Medications on File Prior to Visit   Medication Sig Dispense Refill    cetirizine (ZYRTEC) 10 MG tablet Take 1 tablet (10 mg total) by mouth every evening.  0    fluticasone propionate (FLONASE) 50 mcg/actuation nasal spray 2 sprays (100 mcg total) by Each Nostril route once daily. 1 Bottle 12    sertraline (ZOLOFT) 50 MG tablet Take 1/2 po daily for 1 week, then take 1 daily 30 tablet 5    [DISCONTINUED] diphenhydrAMINE (BENADRYL) 25 mg capsule Take 25 mg by mouth every 4 (four) hours as needed.       [DISCONTINUED] famotidine (PEPCID) 20 MG tablet Take 1 tablet (20 mg total) by mouth 2 (two) times daily. 180 tablet 3    [DISCONTINUED] pseudoephedrine-ibuprofen (ADVIL COLD AND SINUS)  mg Tab Take by mouth daily as needed.       No current facility-administered medications on file prior to visit.      Review of patient's allergies indicates:   Allergen Reactions    Gluten protein     Sulfur      Social History     Socioeconomic History    Marital status:      Spouse name: Not on file    Number of children: Not on file    Years of education: Not on file    Highest education level: Not on file   Occupational History    Not on file   Social Needs    Financial resource strain: Not on file    Food insecurity:     Worry: Not on file     Inability: Not on file    Transportation needs:     Medical: Not on file     Non-medical: Not on file   Tobacco Use    Smoking status: Never Smoker    Smokeless tobacco: Never Used   Substance and Sexual Activity    Alcohol use: No    Drug use: No    Sexual activity: Not on file   Lifestyle    Physical activity:     Days per week: Not on file     Minutes per session: Not on file    Stress: Not on file   Relationships    Social connections:     Talks  "on phone: Not on file     Gets together: Not on file     Attends [employer] service: Not on file     Active member of club or organization: Not on file     Attends meetings of clubs or organizations: Not on file     Relationship status: Not on file   Other Topics Concern    Not on file   Social History Narrative    Not on file     Family History   Problem Relation Age of Onset    Lung cancer Mother     Heart failure Father     Anuerysm Father         abd    Seizures Paternal Aunt     Allergic rhinitis Neg Hx     Asthma Neg Hx     Atopy Neg Hx     Eczema Neg Hx     Immunodeficiency Neg Hx     Rhinitis Neg Hx     Urticaria Neg Hx     Angioedema Neg Hx     Allergies Neg Hx        Review of Systems   Constitutional: Negative for activity change, chills, diaphoresis, fatigue, fever, unexpected weight change and weight loss.   HENT: Negative for ear pain, facial swelling, nosebleeds, rhinorrhea, sneezing, sore throat and trouble swallowing.    Eyes: Negative for photophobia, pain and visual disturbance.   Respiratory: Negative for apnea, cough, chest tightness, shortness of breath and wheezing.    Gastrointestinal: Positive for diarrhea. Negative for abdominal pain, bloating, blood in stool, constipation, flatus, nausea, rectal pain and vomiting.   Genitourinary: Negative for decreased urine volume, difficulty urinating, dysuria, flank pain and hematuria.   Musculoskeletal: Negative for arthralgias, back pain, gait problem, myalgias, neck pain and neck stiffness.   Skin: Negative for pallor and rash.   Neurological: Negative for seizures, syncope, speech difficulty, weakness and headaches.   Hematological: Negative for adenopathy. Does not bruise/bleed easily.   Psychiatric/Behavioral: Negative for behavioral problems, confusion and hallucinations.       Objective:       Vitals:    03/02/20 0754   BP: 114/78   Pulse: (!) 56   Weight: 68.5 kg (151 lb 0.2 oz)   Height: 5' 4" (1.626 m)       Physical Exam "   Constitutional: She is oriented to person, place, and time. She appears well-developed and well-nourished.   HENT:   Head: Normocephalic and atraumatic.   Eyes: Pupils are equal, round, and reactive to light. EOM are normal.   Neck: Normal range of motion. Neck supple. No thyromegaly present.   Cardiovascular: Normal rate, regular rhythm, normal heart sounds and intact distal pulses.   No murmur heard.  Pulmonary/Chest: Effort normal and breath sounds normal. No respiratory distress. She has no wheezes. She has no rales.   Abdominal: Soft. Bowel sounds are normal. She exhibits no distension and no mass. There is no tenderness.   Musculoskeletal: Normal range of motion. She exhibits no edema or tenderness.   Lymphadenopathy:     She has no cervical adenopathy.   Neurological: She is alert and oriented to person, place, and time. She has normal reflexes. No cranial nerve deficit.   Skin: Skin is warm and dry. No erythema.   Psychiatric: She has a normal mood and affect. Her behavior is normal.         Assessment:       1. Celiac disease    2. Diarrhea due to malabsorption        Plan:       Celiac disease  -     MRI Abdomen-Pelvis w w/o Contrast (XPD); Future; Expected date: 03/03/2020  -     Case request GI: ESOPHAGOGASTRODUODENOSCOPY (EGD)  -     Celiac Disease Panel; Future; Expected date: 03/02/2020  -     Ambulatory referral/consult to Nutrition Services; Future; Expected date: 03/09/2020  -     X-Ray Chest 1 View; Future; Expected date: 03/02/2020  -     Basic metabolic panel; Future; Expected date: 03/02/2020  -     Protime-INR; Future; Expected date: 03/02/2020  -     CBC auto differential; Future; Expected date: 03/02/2020  -     TSH; Future; Expected date: 03/02/2020    Follow up in about 1 year (around 3/2/2021), or if symptoms worsen or fail to improve. Patient had a normal colonoscopy in 2016 with normal biopsies, no evidence of microscopic colitis was seen. She does have diverticulosis. Follow up  recommended with GI every year.     Risks, benefits and alternative options were discussed with the patient. Risks including but not limited to infection, bleeding, heart or respiratory problems and perforation that may require surgery were all explained to the patient. The patient had a chance for questions if there were doubts or concerns about the test. The referring provider will be notified that our consultation is complete and available through the patient's records.    Thanks for letting us participate in the care of this nice patient,      Ganga Dejesus

## 2020-03-05 ENCOUNTER — PATIENT MESSAGE (OUTPATIENT)
Dept: GASTROENTEROLOGY | Facility: CLINIC | Age: 60
End: 2020-03-05

## 2020-03-05 ENCOUNTER — TELEPHONE (OUTPATIENT)
Dept: GASTROENTEROLOGY | Facility: CLINIC | Age: 60
End: 2020-03-05

## 2020-03-05 NOTE — TELEPHONE ENCOUNTER
----- Message from Monique Buchanan LPN sent at 3/3/2020  2:09 PM CST -----  Mary Carmen De La Rosa, ANDERSON Chicas RD, CDE; Monique Buchanan LPN  Cc: Ruby Babcock RD, CDE         Roberta Halphen is the RD for nutrition consults.   If you add a nutrition referral, it should go to schedulers for Roberta.   Previous Messages        ----- Message -----   From: Kavita Chicas RD, CDE   Sent: 3/2/2020   1:22 PM CST   To: Monique Buchanan LPN, #   Subject: Nutrition Referral                               The diabetes department only sees Diabetes patients now, our schedules are private. The number to schedule diabetes patients is [phone]. We have referral coordinators who will schedule the appts. Any nutrition consults other than Diabetes should be ordered as a nutrition referral.   Thanks!     ----- Message -----   From: Monique Buchanan LPN   Sent: 3/2/2020   1:15 PM CST   To: , #     Pt needs an appt with Nutritional services.   I was not able to book this, can someone contact the pt to schedule?   Referral is done by Dr Dejesus.     Can we schedule on DM or RD?     Thanks   Monique GODDARD   Gastro dept

## 2020-03-05 NOTE — H&P (VIEW-ONLY)
Your bloodwork looks fine and does not require any change in treatment.     Please contact me if you have any additional concerns.    Sincerely,    Ganga Dejesus

## 2020-03-12 ENCOUNTER — ANESTHESIA EVENT (OUTPATIENT)
Dept: ENDOSCOPY | Facility: HOSPITAL | Age: 60
End: 2020-03-12
Payer: COMMERCIAL

## 2020-03-12 ENCOUNTER — ANESTHESIA (OUTPATIENT)
Dept: ENDOSCOPY | Facility: HOSPITAL | Age: 60
End: 2020-03-12
Payer: COMMERCIAL

## 2020-03-12 ENCOUNTER — HOSPITAL ENCOUNTER (OUTPATIENT)
Facility: HOSPITAL | Age: 60
Discharge: HOME | End: 2020-03-12
Attending: INTERNAL MEDICINE | Admitting: INTERNAL MEDICINE
Payer: COMMERCIAL

## 2020-03-12 VITALS
SYSTOLIC BLOOD PRESSURE: 130 MMHG | TEMPERATURE: 97 F | BODY MASS INDEX: 25.56 KG/M2 | HEIGHT: 64 IN | WEIGHT: 149.69 LBS | HEART RATE: 60 BPM | RESPIRATION RATE: 20 BRPM | DIASTOLIC BLOOD PRESSURE: 74 MMHG | OXYGEN SATURATION: 96 %

## 2020-03-12 DIAGNOSIS — K90.0 CELIAC DISEASE: ICD-10-CM

## 2020-03-12 PROCEDURE — 88341 IMHCHEM/IMCYTCHM EA ADD ANTB: CPT | Performed by: PATHOLOGY

## 2020-03-12 PROCEDURE — 37000008 HC ANESTHESIA 1ST 15 MINUTES: Performed by: INTERNAL MEDICINE

## 2020-03-12 PROCEDURE — 43239 EGD BIOPSY SINGLE/MULTIPLE: CPT | Mod: ,,, | Performed by: INTERNAL MEDICINE

## 2020-03-12 PROCEDURE — 88342 IMHCHEM/IMCYTCHM 1ST ANTB: CPT | Mod: 26,,, | Performed by: PATHOLOGY

## 2020-03-12 PROCEDURE — 27201012 HC FORCEPS, HOT/COLD, DISP: Performed by: INTERNAL MEDICINE

## 2020-03-12 PROCEDURE — 43239 PR EGD, FLEX, W/BIOPSY, SGL/MULTI: ICD-10-PCS | Mod: ,,, | Performed by: INTERNAL MEDICINE

## 2020-03-12 PROCEDURE — 88342 CHG IMMUNOCYTOCHEMISTRY: ICD-10-PCS | Mod: 26,,, | Performed by: PATHOLOGY

## 2020-03-12 PROCEDURE — 63600175 PHARM REV CODE 636 W HCPCS: Performed by: NURSE ANESTHETIST, CERTIFIED REGISTERED

## 2020-03-12 PROCEDURE — 88305 TISSUE EXAM BY PATHOLOGIST: CPT | Mod: 26,,, | Performed by: PATHOLOGY

## 2020-03-12 PROCEDURE — 43239 EGD BIOPSY SINGLE/MULTIPLE: CPT | Performed by: INTERNAL MEDICINE

## 2020-03-12 PROCEDURE — 88341 IMHCHEM/IMCYTCHM EA ADD ANTB: CPT | Mod: 26,,, | Performed by: PATHOLOGY

## 2020-03-12 PROCEDURE — 25000003 PHARM REV CODE 250: Performed by: NURSE ANESTHETIST, CERTIFIED REGISTERED

## 2020-03-12 PROCEDURE — 88342 IMHCHEM/IMCYTCHM 1ST ANTB: CPT | Performed by: PATHOLOGY

## 2020-03-12 PROCEDURE — 88341 PR IHC OR ICC EACH ADD'L SINGLE ANTIBODY  STAINPR: ICD-10-PCS | Mod: 26,,, | Performed by: PATHOLOGY

## 2020-03-12 PROCEDURE — 88305 TISSUE EXAM BY PATHOLOGIST: CPT | Performed by: PATHOLOGY

## 2020-03-12 PROCEDURE — 88305 TISSUE EXAM BY PATHOLOGIST: ICD-10-PCS | Mod: 26,,, | Performed by: PATHOLOGY

## 2020-03-12 RX ORDER — SODIUM CHLORIDE, SODIUM LACTATE, POTASSIUM CHLORIDE, CALCIUM CHLORIDE 600; 310; 30; 20 MG/100ML; MG/100ML; MG/100ML; MG/100ML
INJECTION, SOLUTION INTRAVENOUS CONTINUOUS PRN
Status: DISCONTINUED | OUTPATIENT
Start: 2020-03-12 | End: 2020-03-12

## 2020-03-12 RX ORDER — GLYCOPYRROLATE 0.2 MG/ML
INJECTION INTRAMUSCULAR; INTRAVENOUS
Status: DISCONTINUED | OUTPATIENT
Start: 2020-03-12 | End: 2020-03-12

## 2020-03-12 RX ORDER — PROPOFOL 10 MG/ML
INJECTION, EMULSION INTRAVENOUS
Status: DISCONTINUED | OUTPATIENT
Start: 2020-03-12 | End: 2020-03-12

## 2020-03-12 RX ORDER — LIDOCAINE HCL/PF 100 MG/5ML
SYRINGE (ML) INTRAVENOUS
Status: DISCONTINUED | OUTPATIENT
Start: 2020-03-12 | End: 2020-03-12

## 2020-03-12 RX ORDER — SODIUM CHLORIDE, SODIUM LACTATE, POTASSIUM CHLORIDE, CALCIUM CHLORIDE 600; 310; 30; 20 MG/100ML; MG/100ML; MG/100ML; MG/100ML
INJECTION, SOLUTION INTRAVENOUS CONTINUOUS
Status: DISCONTINUED | OUTPATIENT
Start: 2020-03-12 | End: 2020-03-12 | Stop reason: HOSPADM

## 2020-03-12 RX ADMIN — GLYCOPYRROLATE 0.2 MG: 0.2 INJECTION INTRAMUSCULAR; INTRAVENOUS at 10:03

## 2020-03-12 RX ADMIN — LIDOCAINE HYDROCHLORIDE 50 MG: 20 INJECTION, SOLUTION INTRAVENOUS at 10:03

## 2020-03-12 RX ADMIN — PROPOFOL 30 MG: 10 INJECTION, EMULSION INTRAVENOUS at 10:03

## 2020-03-12 RX ADMIN — SODIUM CHLORIDE, SODIUM LACTATE, POTASSIUM CHLORIDE, AND CALCIUM CHLORIDE: 600; 310; 30; 20 INJECTION, SOLUTION INTRAVENOUS at 10:03

## 2020-03-12 RX ADMIN — PROPOFOL 40 MG: 10 INJECTION, EMULSION INTRAVENOUS at 10:03

## 2020-03-12 RX ADMIN — PROPOFOL 100 MG: 10 INJECTION, EMULSION INTRAVENOUS at 10:03

## 2020-03-12 NOTE — DISCHARGE INSTRUCTIONS

## 2020-03-12 NOTE — BRIEF OP NOTE
Endoscopy Discharge Summary      Admit Date: 3/12/2020    Discharge Date and Time:  3/12/2020 10:25 AM    Attending Physician: Maureen Santacruz MD     Discharge Physician: Maureen Santacruz MD     Principal Admitting Diagnoses: <principal problem not specified>         Discharge Diagnosis: The encounter diagnosis was Celiac disease.     Discharged Condition: Good    Indication for Admission: <principal problem not specified>     Hospital Course: Patient was admitted for an inpatient procedure and tolerated the procedure well with no complications.    Significant Diagnostic Studies: EGD with biopsy    Pathology (if any):  Specimen (12h ago, onward)    None          Estimated Blood Loss: 3 ml.    Discussed with: patient and family.    Disposition: Home.    Follow Up/Patient Instructions:   Current Discharge Medication List      CONTINUE these medications which have NOT CHANGED    Details   cetirizine (ZYRTEC) 10 MG tablet Take 1 tablet (10 mg total) by mouth every evening.  Refills: 0      fluticasone propionate (FLONASE) 50 mcg/actuation nasal spray 2 sprays (100 mcg total) by Each Nostril route once daily.  Qty: 1 Bottle, Refills: 12      sertraline (ZOLOFT) 50 MG tablet Take 1/2 po daily for 1 week, then take 1 daily  Qty: 30 tablet, Refills: 5             No discharge procedures on file.

## 2020-03-12 NOTE — TRANSFER OF CARE
"Anesthesia Transfer of Care Note    Patient: Meg Goff    Procedure(s) Performed: Procedure(s) (LRB):  ESOPHAGOGASTRODUODENOSCOPY (EGD) (Left)    Patient location: PACU    Anesthesia Type: MAC    Transport from OR: Transported from OR on room air with adequate spontaneous ventilation    Post pain: adequate analgesia    Post assessment: no apparent anesthetic complications    Post vital signs: stable    Level of consciousness: responds to stimulation    Nausea/Vomiting: no nausea/vomiting    Complications: none    Transfer of care protocol was followed      Last vitals:   Visit Vitals  /65   Pulse (!) 59   Temp 36.2 °C (97.2 °F)   Resp 18   Ht 5' 4" (1.626 m)   Wt 67.9 kg (149 lb 11.1 oz)   SpO2 98%   BMI 25.69 kg/m²     "

## 2020-03-12 NOTE — ANESTHESIA RELEASE NOTE
"Anesthesia Release from PACU Note    Patient: Meg Goff    Procedure(s) Performed: Procedure(s) (LRB):  ESOPHAGOGASTRODUODENOSCOPY (EGD) (Left)    Anesthesia type: MAC    Post pain: Adequate analgesia    Post assessment: no apparent anesthetic complications, tolerated procedure well and no evidence of recall    Last Vitals:   Visit Vitals  /65   Pulse (!) 59   Temp 36.2 °C (97.2 °F)   Resp 18   Ht 5' 4" (1.626 m)   Wt 67.9 kg (149 lb 11.1 oz)   SpO2 98%   BMI 25.69 kg/m²       Post vital signs: stable    Level of consciousness: responds to stimulation    Nausea/Vomiting: no nausea/no vomiting    Complications: none    Airway Patency: patent    Respiratory: unassisted    Cardiovascular: stable and blood pressure at baseline    Hydration: euvolemic     "

## 2020-03-12 NOTE — ANESTHESIA PREPROCEDURE EVALUATION
03/12/2020  Meg Goff is a 60 y.o., female.    Anesthesia Evaluation    I have reviewed the Patient Summary Reports.    I have reviewed the Nursing Notes.   I have reviewed the Medications.     Review of Systems  Anesthesia Hx:  No problems with previous Anesthesia  Denies Family Hx of Anesthesia complications.   Denies Personal Hx of Anesthesia complications.   Social:  Non-Smoker    Hematology/Oncology:  Hematology Normal   Oncology Normal     EENT/Dental:EENT/Dental Normal   Cardiovascular:  Cardiovascular Normal Exercise tolerance: good     Pulmonary:  Pulmonary Normal    Renal/:  Renal/ Normal     Musculoskeletal:   Arthritis     Neurological:  Neurology Normal    Endocrine:  Endocrine Normal    Dermatological:  Skin Normal    Psych:  Psychiatric Normal           Physical Exam  General:  Well nourished    Airway/Jaw/Neck:  Airway Findings: Mouth Opening: Normal Tongue: Normal  General Airway Assessment: Adult, Average  Mallampati: II  TM Distance: Normal, at least 6 cm      Dental:  Dental Findings: In tact   Chest/Lungs:  Chest/Lungs Findings: Clear to auscultation, Normal Respiratory Rate     Heart/Vascular:  Heart Findings: Rate: Normal  Rhythm: Regular Rhythm        Mental Status:  Mental Status Findings:  Cooperative, Alert and Oriented         Anesthesia Plan  Type of Anesthesia, risks & benefits discussed:  Anesthesia Type:  MAC  Patient's Preference:   Intra-op Monitoring Plan: standard ASA monitors  Intra-op Monitoring Plan Comments:   Post Op Pain Control Plan:   Post Op Pain Control Plan Comments:   Induction:   IV  Beta Blocker:  Patient is not currently on a Beta-Blocker (No further documentation required).       Informed Consent: Patient understands risks and agrees with Anesthesia plan.  Questions answered. Anesthesia consent signed with patient.  ASA Score: 2     Day of  Surgery Review of History & Physical: I have interviewed and examined the patient. I have reviewed the patient's H&P dated:  There are no significant changes.          Ready For Surgery From Anesthesia Perspective.

## 2020-03-12 NOTE — INTERVAL H&P NOTE
The patient has been examined and the H&P has been reviewed:    I concur with the findings and no changes have occurred since H&P was written.    Anesthesia/Surgery risks, benefits and alternative options discussed and understood by patient/family.          Active Hospital Problems    Diagnosis  POA    Celiac disease [K90.0]  Yes      Resolved Hospital Problems   No resolved problems to display.

## 2020-03-12 NOTE — ANESTHESIA POSTPROCEDURE EVALUATION
Anesthesia Post Evaluation    Patient: Meg Goff    Procedure(s) Performed: Procedure(s) (LRB):  ESOPHAGOGASTRODUODENOSCOPY (EGD) (Left)    Final Anesthesia Type: MAC    Patient location during evaluation: PACU  Patient participation: Yes- Able to Participate  Level of consciousness: awake and alert and awake  Post-procedure vital signs: reviewed and stable  Pain management: adequate  Airway patency: patent    PONV status at discharge: No PONV  Anesthetic complications: no      Cardiovascular status: blood pressure returned to baseline  Respiratory status: unassisted, spontaneous ventilation and room air  Hydration status: euvolemic  Follow-up not needed.          Vitals Value Taken Time   /65 3/12/2020  9:12 AM   Temp 36.2 °C (97.2 °F) 3/12/2020  9:12 AM   Pulse 59 3/12/2020  9:12 AM   Resp 18 3/12/2020  9:12 AM   SpO2 98 % 3/12/2020  9:12 AM         No case tracking events are documented in the log.      Pain/Kandy Score: No data recorded

## 2020-03-12 NOTE — [employer] PATIENT INSTRUCTIONS
Discharge Summary/Instructions after an Endoscopic Procedure  Patient Name: Meg Goff  Patient MRN: [id number]  Patient YOB: 1960 Thursday, March 12, 2020 Maureen Santacruz MD  RESTRICTIONS:  During your procedure today, you received medications for sedation.  These   medications may affect your judgment, balance and coordination.  Therefore,   for 24 hours, you have the following restrictions:   - DO NOT drive a car, operate machinery, make legal/financial decisions,   sign important papers or drink alcohol.    ACTIVITY:  Today: no heavy lifting, straining or running due to procedural   sedation/anesthesia.  The following day: return to full activity including work.  DIET:  Eat and drink normally unless instructed otherwise.     TREATMENT FOR COMMON SIDE EFFECTS:  - Mild abdominal pain, nausea, belching, bloating or excessive gas:  rest,   eat lightly and use a heating pad.  - Sore Throat: treat with throat lozenges and/or gargle with warm salt   water.  - Because air was used during the procedure, expelling large amounts of air   from your rectum or belching is normal.  - If a bowel prep was taken, you may not have a bowel movement for 1-3 days.    This is normal.  SYMPTOMS TO WATCH FOR AND REPORT TO YOUR PHYSICIAN:  1. Abdominal pain or bloating, other than gas cramps.  2. Chest pain.  3. Back pain.  4. Signs of infection such as: chills or fever occurring within 24 hours   after the procedure.  5. Rectal bleeding, which would show as bright red, maroon, or black stools.   (A tablespoon of blood from the rectum is not serious, especially if   hemorrhoids are present.)  6. Vomiting.  7. Weakness or dizziness.  GO DIRECTLY TO THE NEAREST EMERGENCY ROOM IF YOU HAVE ANY OF THE FOLLOWING:      Difficulty breathing              Chills and/or fever over 101 F   Persistent vomiting and/or vomiting blood   Severe abdominal pain   Severe chest pain   Black, tarry stools   Bleeding- more than one  tablespoon   Any other symptom or condition that you feel may need urgent attention  Your doctor recommends these additional instructions:  If any biopsies were taken, your doctors clinic will contact you in 1 to 2   weeks with any results.  - Await pathology results.   - No repeat upper endoscopy.   - Discharge patient to home (with escort).   - Return to GI clinic in 2 weeks.  For questions, problems or results please call your physician Maureen Santacruz MD at Work:  [phone]  If you have any questions about the above instructions, call the GI   department at [phone] or call the endoscopy unit at [phone]   from 7am until 3 pm.  [facility] [place], EMERGENCY ROOM PHONE NUMBER:   [phone]  IF A COMPLICATION OR EMERGENCY SITUATION ARISES AND YOU ARE UNABLE TO REACH   YOUR PHYSICIAN - GO DIRECTLY TO THE EMERGENCY ROOM.  I have read or have had read to me these discharge instructions for my   procedure and have received a written copy.  I understand these   instructions and will follow-up with my physician if I have any questions.     __________________________________       _____________________________________  Nurse Signature                                          Patient/Designated   Responsible Party Signature  MD Maureen Manzo MD  3/12/2020 10:30:38 AM  This report has been verified and signed electronically.  [employer]

## 2020-03-18 ENCOUNTER — PATIENT MESSAGE (OUTPATIENT)
Dept: GASTROENTEROLOGY | Facility: CLINIC | Age: 60
End: 2020-03-18

## 2020-03-18 ENCOUNTER — TELEPHONE (OUTPATIENT)
Dept: RADIOLOGY | Facility: HOSPITAL | Age: 60
End: 2020-03-18

## 2020-03-20 ENCOUNTER — PATIENT MESSAGE (OUTPATIENT)
Dept: GASTROENTEROLOGY | Facility: CLINIC | Age: 60
End: 2020-03-20

## 2020-03-23 LAB
FINAL PATHOLOGIC DIAGNOSIS: NORMAL
GROSS: NORMAL

## 2020-03-25 NOTE — PROGRESS NOTES
AN staff: Please inform patient that her biopsies are most consistent with active celiac enteropathy. This is likely the cause of her symptoms. Recommend she meet with a nutritonist to discuss cross contamination when eating out or inadvertent gluten ingestion. May follow up with Dr. Dejesus if symptoms persist despite the above measures.

## 2020-10-06 ENCOUNTER — PATIENT MESSAGE (OUTPATIENT)
Dept: ADMINISTRATIVE | Facility: HOSPITAL | Age: 60
End: 2020-10-06

## 2020-10-15 ENCOUNTER — PATIENT OUTREACH (OUTPATIENT)
Dept: ADMINISTRATIVE | Facility: HOSPITAL | Age: 60
End: 2020-10-15

## 2020-10-21 NOTE — PROGRESS NOTES
Subjective:       Patient ID: Meg Goff is a 60 y.o. female.    Chief Complaint: Gynecologic Exam    Gynecologic Exam  The patient's pertinent negatives include no pelvic pain, vaginal bleeding or vaginal discharge. The problem has been unchanged. She is not pregnant. Pertinent negatives include no abdominal pain, diarrhea, fever, headaches, rash, sore throat or vomiting. She is sexually active. No, her partner does not have an STD. She uses nothing for contraception. She is postmenopausal.       Review of Systems   Constitutional: Negative for fatigue, fever and unexpected weight change.   HENT: Negative for ear pain and sore throat.    Eyes: Negative for pain and visual disturbance.   Respiratory: Negative for cough and shortness of breath.    Cardiovascular: Negative for chest pain and palpitations.   Gastrointestinal: Negative for abdominal pain, diarrhea and vomiting.   Genitourinary: Negative for pelvic pain and vaginal discharge.   Musculoskeletal: Negative for arthralgias and myalgias.   Skin: Negative for color change and rash.   Neurological: Negative for dizziness and headaches.   Psychiatric/Behavioral: Negative for dysphoric mood and sleep disturbance. The patient is not nervous/anxious.        Vitals:    10/22/20 0956   BP: 120/74   Pulse: 64   Temp: 97.9 °F (36.6 °C)       Objective:     Current Outpatient Medications   Medication Sig Dispense Refill    cetirizine (ZYRTEC) 10 MG tablet Take 1 tablet (10 mg total) by mouth every evening.  0    fluticasone propionate (FLONASE) 50 mcg/actuation nasal spray 2 sprays (100 mcg total) by Each Nostril route once daily. 1 Bottle 12    sertraline (ZOLOFT) 50 MG tablet Take 1/2 po daily for 1 week, then take 1 daily 30 tablet 5     No current facility-administered medications for this visit.        Physical Exam  Constitutional:       General: She is not in acute distress.     Appearance: She is well-developed.   HENT:      Head: Normocephalic and  atraumatic.   Eyes:      Pupils: Pupils are equal, round, and reactive to light.   Cardiovascular:      Rate and Rhythm: Normal rate.   Pulmonary:      Effort: Pulmonary effort is normal.   Chest:      Breasts:         Right: No mass, nipple discharge or skin change.         Left: No mass, nipple discharge or skin change.   Abdominal:      General: There is no distension.      Tenderness: There is no abdominal tenderness.   Genitourinary:     Labia:         Right: No rash.         Left: No rash.       Vagina: Normal.      Cervix: No cervical motion tenderness, discharge or friability.      Uterus: Not tender.       Adnexa:         Right: No mass or tenderness.          Left: No mass or tenderness.     Skin:     General: Skin is warm and dry.         Pap smear performed. Endocervical sample obtained using a brush. Cervix without bleeding or discharge. Vaginal wall with no abnormalities noted. Pt tolerated procedure well. Sample will be sent for pathology.    Assessment:       1. Encounter for Papanicolaou smear for cervical cancer screening        Plan:   Encounter for Papanicolaou smear for cervical cancer screening  -     HPV High Risk Genotypes, PCR  -     Liquid-Based Pap Smear, Screening        No follow-ups on file.    There are no Patient Instructions on file for this visit.

## 2020-10-22 ENCOUNTER — OFFICE VISIT (OUTPATIENT)
Dept: FAMILY MEDICINE | Facility: CLINIC | Age: 60
End: 2020-10-22
Payer: COMMERCIAL

## 2020-10-22 VITALS
TEMPERATURE: 98 F | HEART RATE: 64 BPM | SYSTOLIC BLOOD PRESSURE: 120 MMHG | DIASTOLIC BLOOD PRESSURE: 74 MMHG | HEIGHT: 64 IN | BODY MASS INDEX: 27.83 KG/M2 | WEIGHT: 163 LBS

## 2020-10-22 DIAGNOSIS — Z12.4 ENCOUNTER FOR PAPANICOLAOU SMEAR FOR CERVICAL CANCER SCREENING: Primary | ICD-10-CM

## 2020-10-22 PROCEDURE — 87624 HPV HI-RISK TYP POOLED RSLT: CPT

## 2020-10-22 PROCEDURE — 99999 PR PBB SHADOW E&M-EST. PATIENT-LVL III: CPT | Mod: PBBFAC,,, | Performed by: NURSE PRACTITIONER

## 2020-10-22 PROCEDURE — 3008F PR BODY MASS INDEX (BMI) DOCUMENTED: ICD-10-PCS | Mod: CPTII,S$GLB,, | Performed by: NURSE PRACTITIONER

## 2020-10-22 PROCEDURE — 99396 PREV VISIT EST AGE 40-64: CPT | Mod: 25,S$GLB,, | Performed by: NURSE PRACTITIONER

## 2020-10-22 PROCEDURE — 88175 CYTOPATH C/V AUTO FLUID REDO: CPT

## 2020-10-22 PROCEDURE — 3008F BODY MASS INDEX DOCD: CPT | Mod: CPTII,S$GLB,, | Performed by: NURSE PRACTITIONER

## 2020-10-22 PROCEDURE — 99396 PR PREVENTIVE VISIT,EST,40-64: ICD-10-PCS | Mod: 25,S$GLB,, | Performed by: NURSE PRACTITIONER

## 2020-10-22 PROCEDURE — 99999 PR PBB SHADOW E&M-EST. PATIENT-LVL III: ICD-10-PCS | Mod: PBBFAC,,, | Performed by: NURSE PRACTITIONER

## 2020-11-03 LAB
HPV HR 12 DNA SPEC QL NAA+PROBE: NEGATIVE
HPV16 AG SPEC QL: NEGATIVE
HPV18 DNA SPEC QL NAA+PROBE: NEGATIVE

## 2020-11-23 LAB
FINAL PATHOLOGIC DIAGNOSIS: NORMAL
Lab: NORMAL

## 2021-05-06 ENCOUNTER — PATIENT MESSAGE (OUTPATIENT)
Dept: RESEARCH | Facility: HOSPITAL | Age: 61
End: 2021-05-06

## 2021-06-30 DIAGNOSIS — Z12.31 OTHER SCREENING MAMMOGRAM: ICD-10-CM

## 2021-07-12 ENCOUNTER — PATIENT MESSAGE (OUTPATIENT)
Dept: ADMINISTRATIVE | Facility: HOSPITAL | Age: 61
End: 2021-07-12

## 2021-07-29 ENCOUNTER — PATIENT OUTREACH (OUTPATIENT)
Dept: ADMINISTRATIVE | Facility: HOSPITAL | Age: 61
End: 2021-07-29

## 2021-07-29 ENCOUNTER — PATIENT MESSAGE (OUTPATIENT)
Dept: ADMINISTRATIVE | Facility: HOSPITAL | Age: 61
End: 2021-07-29

## 2021-08-16 ENCOUNTER — TELEPHONE (OUTPATIENT)
Dept: ADMINISTRATIVE | Facility: HOSPITAL | Age: 61
End: 2021-08-16

## 2021-08-17 ENCOUNTER — HOSPITAL ENCOUNTER (OUTPATIENT)
Dept: RADIOLOGY | Facility: HOSPITAL | Age: 61
Discharge: HOME | End: 2021-08-17
Attending: FAMILY MEDICINE
Payer: COMMERCIAL

## 2021-08-17 VITALS — HEIGHT: 64 IN | BODY MASS INDEX: 27.82 KG/M2 | WEIGHT: 162.94 LBS

## 2021-08-17 DIAGNOSIS — Z12.31 OTHER SCREENING MAMMOGRAM: ICD-10-CM

## 2021-08-17 PROCEDURE — 77063 MAMMO DIGITAL SCREENING BILAT WITH TOMO: ICD-10-PCS | Mod: 26,,, | Performed by: RADIOLOGY

## 2021-08-17 PROCEDURE — 77063 BREAST TOMOSYNTHESIS BI: CPT | Mod: 26,,, | Performed by: RADIOLOGY

## 2021-08-17 PROCEDURE — 77067 SCR MAMMO BI INCL CAD: CPT | Mod: 26,,, | Performed by: RADIOLOGY

## 2021-08-17 PROCEDURE — 77067 MAMMO DIGITAL SCREENING BILAT WITH TOMO: ICD-10-PCS | Mod: 26,,, | Performed by: RADIOLOGY

## 2021-08-17 PROCEDURE — 77067 SCR MAMMO BI INCL CAD: CPT | Mod: TC,PO

## 2021-08-23 ENCOUNTER — OFFICE VISIT (OUTPATIENT)
Dept: FAMILY MEDICINE | Facility: CLINIC | Age: 61
End: 2021-08-23
Payer: COMMERCIAL

## 2021-08-23 VITALS
SYSTOLIC BLOOD PRESSURE: 127 MMHG | BODY MASS INDEX: 28.32 KG/M2 | DIASTOLIC BLOOD PRESSURE: 75 MMHG | HEART RATE: 58 BPM | HEIGHT: 64 IN | OXYGEN SATURATION: 99 % | WEIGHT: 165.88 LBS | TEMPERATURE: 97 F

## 2021-08-23 DIAGNOSIS — H69.91 DYSFUNCTION OF RIGHT EUSTACHIAN TUBE: ICD-10-CM

## 2021-08-23 DIAGNOSIS — F32.9 MAJOR DEPRESSIVE DISORDER WITH CURRENT ACTIVE EPISODE, UNSPECIFIED DEPRESSION EPISODE SEVERITY, UNSPECIFIED WHETHER RECURRENT: Primary | ICD-10-CM

## 2021-08-23 DIAGNOSIS — J32.9 CHRONIC SINUSITIS, UNSPECIFIED LOCATION: ICD-10-CM

## 2021-08-23 PROCEDURE — 99214 PR OFFICE/OUTPT VISIT, EST, LEVL IV, 30-39 MIN: ICD-10-PCS | Mod: S$GLB,,, | Performed by: FAMILY MEDICINE

## 2021-08-23 PROCEDURE — 3074F SYST BP LT 130 MM HG: CPT | Mod: CPTII,S$GLB,, | Performed by: FAMILY MEDICINE

## 2021-08-23 PROCEDURE — 99999 PR PBB SHADOW E&M-EST. PATIENT-LVL III: ICD-10-PCS | Mod: PBBFAC,,, | Performed by: FAMILY MEDICINE

## 2021-08-23 PROCEDURE — 99214 OFFICE O/P EST MOD 30 MIN: CPT | Mod: S$GLB,,, | Performed by: FAMILY MEDICINE

## 2021-08-23 PROCEDURE — 99999 PR PBB SHADOW E&M-EST. PATIENT-LVL III: CPT | Mod: PBBFAC,,, | Performed by: FAMILY MEDICINE

## 2021-08-23 PROCEDURE — 1126F PR PAIN SEVERITY QUANTIFIED, NO PAIN PRESENT: ICD-10-PCS | Mod: CPTII,S$GLB,, | Performed by: FAMILY MEDICINE

## 2021-08-23 PROCEDURE — 3078F PR MOST RECENT DIASTOLIC BLOOD PRESSURE < 80 MM HG: ICD-10-PCS | Mod: CPTII,S$GLB,, | Performed by: FAMILY MEDICINE

## 2021-08-23 PROCEDURE — 3074F PR MOST RECENT SYSTOLIC BLOOD PRESSURE < 130 MM HG: ICD-10-PCS | Mod: CPTII,S$GLB,, | Performed by: FAMILY MEDICINE

## 2021-08-23 PROCEDURE — 3008F BODY MASS INDEX DOCD: CPT | Mod: CPTII,S$GLB,, | Performed by: FAMILY MEDICINE

## 2021-08-23 PROCEDURE — 3078F DIAST BP <80 MM HG: CPT | Mod: CPTII,S$GLB,, | Performed by: FAMILY MEDICINE

## 2021-08-23 PROCEDURE — 3008F PR BODY MASS INDEX (BMI) DOCUMENTED: ICD-10-PCS | Mod: CPTII,S$GLB,, | Performed by: FAMILY MEDICINE

## 2021-08-23 PROCEDURE — 1159F MED LIST DOCD IN RCRD: CPT | Mod: CPTII,S$GLB,, | Performed by: FAMILY MEDICINE

## 2021-08-23 PROCEDURE — 1159F PR MEDICATION LIST DOCUMENTED IN MEDICAL RECORD: ICD-10-PCS | Mod: CPTII,S$GLB,, | Performed by: FAMILY MEDICINE

## 2021-08-23 PROCEDURE — 1126F AMNT PAIN NOTED NONE PRSNT: CPT | Mod: CPTII,S$GLB,, | Performed by: FAMILY MEDICINE

## 2021-08-23 RX ORDER — SERTRALINE HYDROCHLORIDE 25 MG/1
25 TABLET, FILM COATED ORAL NIGHTLY
Qty: 30 TABLET | Refills: 1 | Status: SHIPPED | OUTPATIENT
Start: 2021-08-23 | End: 2021-09-21 | Stop reason: SDUPTHER

## 2021-09-21 ENCOUNTER — OFFICE VISIT (OUTPATIENT)
Dept: FAMILY MEDICINE | Facility: CLINIC | Age: 61
End: 2021-09-21
Payer: COMMERCIAL

## 2021-09-21 DIAGNOSIS — F32.9 MAJOR DEPRESSIVE DISORDER WITH CURRENT ACTIVE EPISODE, UNSPECIFIED DEPRESSION EPISODE SEVERITY, UNSPECIFIED WHETHER RECURRENT: Primary | ICD-10-CM

## 2021-09-21 DIAGNOSIS — J32.9 CHRONIC SINUSITIS, UNSPECIFIED LOCATION: ICD-10-CM

## 2021-09-21 PROCEDURE — 99213 PR OFFICE/OUTPT VISIT, EST, LEVL III, 20-29 MIN: ICD-10-PCS | Mod: 95,,, | Performed by: FAMILY MEDICINE

## 2021-09-21 PROCEDURE — 99213 OFFICE O/P EST LOW 20 MIN: CPT | Mod: 95,,, | Performed by: FAMILY MEDICINE

## 2021-09-21 RX ORDER — SERTRALINE HYDROCHLORIDE 25 MG/1
25 TABLET, FILM COATED ORAL NIGHTLY
Qty: 90 TABLET | Refills: 1 | Status: SHIPPED | OUTPATIENT
Start: 2021-09-21 | End: 2021-10-13

## 2021-10-06 ENCOUNTER — PATIENT MESSAGE (OUTPATIENT)
Dept: FAMILY MEDICINE | Facility: CLINIC | Age: 61
End: 2021-10-06

## 2021-10-13 RX ORDER — SERTRALINE HYDROCHLORIDE 25 MG/1
TABLET, FILM COATED ORAL
Qty: 30 TABLET | Refills: 0 | Status: SHIPPED | OUTPATIENT
Start: 2021-10-13 | End: 2022-03-08

## 2022-01-06 ENCOUNTER — PATIENT MESSAGE (OUTPATIENT)
Dept: ADMINISTRATIVE | Facility: OTHER | Age: 62
End: 2022-01-06
Payer: COMMERCIAL

## 2022-01-06 ENCOUNTER — PATIENT MESSAGE (OUTPATIENT)
Dept: FAMILY MEDICINE | Facility: CLINIC | Age: 62
End: 2022-01-06
Payer: COMMERCIAL

## 2022-01-07 ENCOUNTER — PATIENT MESSAGE (OUTPATIENT)
Dept: FAMILY MEDICINE | Facility: CLINIC | Age: 62
End: 2022-01-07
Payer: COMMERCIAL

## 2022-01-07 RX ORDER — METHYLPREDNISOLONE 4 MG/1
TABLET ORAL
Qty: 21 TABLET | Refills: 0 | Status: SHIPPED | OUTPATIENT
Start: 2022-01-07 | End: 2022-03-08

## 2022-02-02 ENCOUNTER — PATIENT MESSAGE (OUTPATIENT)
Dept: FAMILY MEDICINE | Facility: CLINIC | Age: 62
End: 2022-02-02
Payer: COMMERCIAL

## 2022-02-02 RX ORDER — FLUTICASONE PROPIONATE 50 MCG
2 SPRAY, SUSPENSION (ML) NASAL DAILY
Qty: 18.2 ML | Refills: 11 | Status: SHIPPED | OUTPATIENT
Start: 2022-02-02 | End: 2023-02-10 | Stop reason: SDUPTHER

## 2022-02-02 NOTE — TELEPHONE ENCOUNTER
No new care gaps identified.  Powered by [employer]. Reference number: [id number].   2/02/2022 8:16:38 AM CST

## 2022-03-07 ENCOUNTER — PATIENT MESSAGE (OUTPATIENT)
Dept: FAMILY MEDICINE | Facility: CLINIC | Age: 62
End: 2022-03-07
Payer: COMMERCIAL

## 2022-03-08 ENCOUNTER — OFFICE VISIT (OUTPATIENT)
Dept: FAMILY MEDICINE | Facility: CLINIC | Age: 62
End: 2022-03-08
Payer: COMMERCIAL

## 2022-03-08 VITALS
TEMPERATURE: 98 F | DIASTOLIC BLOOD PRESSURE: 71 MMHG | HEART RATE: 75 BPM | HEIGHT: 64 IN | WEIGHT: 169 LBS | BODY MASS INDEX: 28.85 KG/M2 | SYSTOLIC BLOOD PRESSURE: 109 MMHG

## 2022-03-08 DIAGNOSIS — J32.9 SINUSITIS, UNSPECIFIED CHRONICITY, UNSPECIFIED LOCATION: Primary | ICD-10-CM

## 2022-03-08 PROBLEM — F32.9 MAJOR DEPRESSIVE DISORDER WITH CURRENT ACTIVE EPISODE: Status: RESOLVED | Noted: 2021-08-23 | Resolved: 2022-03-08

## 2022-03-08 PROCEDURE — 1159F PR MEDICATION LIST DOCUMENTED IN MEDICAL RECORD: ICD-10-PCS | Mod: CPTII,S$GLB,, | Performed by: FAMILY MEDICINE

## 2022-03-08 PROCEDURE — 3078F DIAST BP <80 MM HG: CPT | Mod: CPTII,S$GLB,, | Performed by: FAMILY MEDICINE

## 2022-03-08 PROCEDURE — 99999 PR PBB SHADOW E&M-EST. PATIENT-LVL III: ICD-10-PCS | Mod: PBBFAC,,, | Performed by: FAMILY MEDICINE

## 2022-03-08 PROCEDURE — 3008F BODY MASS INDEX DOCD: CPT | Mod: CPTII,S$GLB,, | Performed by: FAMILY MEDICINE

## 2022-03-08 PROCEDURE — 3074F PR MOST RECENT SYSTOLIC BLOOD PRESSURE < 130 MM HG: ICD-10-PCS | Mod: CPTII,S$GLB,, | Performed by: FAMILY MEDICINE

## 2022-03-08 PROCEDURE — 3078F PR MOST RECENT DIASTOLIC BLOOD PRESSURE < 80 MM HG: ICD-10-PCS | Mod: CPTII,S$GLB,, | Performed by: FAMILY MEDICINE

## 2022-03-08 PROCEDURE — 99999 PR PBB SHADOW E&M-EST. PATIENT-LVL III: CPT | Mod: PBBFAC,,, | Performed by: FAMILY MEDICINE

## 2022-03-08 PROCEDURE — 3008F PR BODY MASS INDEX (BMI) DOCUMENTED: ICD-10-PCS | Mod: CPTII,S$GLB,, | Performed by: FAMILY MEDICINE

## 2022-03-08 PROCEDURE — 1159F MED LIST DOCD IN RCRD: CPT | Mod: CPTII,S$GLB,, | Performed by: FAMILY MEDICINE

## 2022-03-08 PROCEDURE — 99213 OFFICE O/P EST LOW 20 MIN: CPT | Mod: S$GLB,,, | Performed by: FAMILY MEDICINE

## 2022-03-08 PROCEDURE — 99213 PR OFFICE/OUTPT VISIT, EST, LEVL III, 20-29 MIN: ICD-10-PCS | Mod: S$GLB,,, | Performed by: FAMILY MEDICINE

## 2022-03-08 PROCEDURE — 3074F SYST BP LT 130 MM HG: CPT | Mod: CPTII,S$GLB,, | Performed by: FAMILY MEDICINE

## 2022-03-08 RX ORDER — AMOXICILLIN AND CLAVULANATE POTASSIUM 875; 125 MG/1; MG/1
1 TABLET, FILM COATED ORAL 2 TIMES DAILY
Qty: 20 TABLET | Refills: 0 | Status: SHIPPED | OUTPATIENT
Start: 2022-03-08 | End: 2022-03-18

## 2022-03-08 RX ORDER — BENZONATATE 200 MG/1
200 CAPSULE ORAL 3 TIMES DAILY PRN
Qty: 30 CAPSULE | Refills: 0 | Status: SHIPPED | OUTPATIENT
Start: 2022-03-08 | End: 2022-03-18

## 2022-03-08 RX ORDER — IBUPROFEN, PSEUDOEPHEDRINE HYDROCHLORIDE 200; 30 MG/1; MG/1
CAPSULE, LIQUID FILLED ORAL
COMMUNITY
End: 2022-10-21

## 2022-03-08 NOTE — PROGRESS NOTES
Patient complains of sinus congestion, sore throat, postnasal drainage, slight cough, no fever. Symptoms started over the past 8 days.  Current treatment Zyrtec, Flonase, decongestant.  She had her flu and COVID vaccines.  She denies any body aches or chills.        Meg was seen today for otalgia, cough, nasal congestion, headache, sore throat and fatigue.    Diagnoses and all orders for this visit:    Sinusitis, unspecified chronicity, unspecified location    Other orders  -     amoxicillin-clavulanate 875-125mg (AUGMENTIN) 875-125 mg per tablet; Take 1 tablet by mouth 2 (two) times daily. for 10 days  -     benzonatate (TESSALON) 200 MG capsule; Take 1 capsule (200 mg total) by mouth 3 (three) times daily as needed for Cough.      Declines COVID, flu testing    Follow-up as needed if symptoms not improving with treatment prescribed in next few days.         Past Medical History:  Past Medical History:   Diagnosis Date    Celiac disease     DDD (degenerative disc disease), cervical     Duodenitis     Frozen shoulder syndrome     H. pylori infection     Major depressive disorder with current active episode 2021    Menopausal symptoms      Past Surgical History:   Procedure Laterality Date     SECTION      COLONOSCOPY      COLONOSCOPY N/A 2016    Procedure: COLONOSCOPY;  Surgeon: Roberta Boateng MD;  Location: [facility];  Service: Endoscopy;  Laterality: N/A;    ESOPHAGOGASTRODUODENOSCOPY  [id number]    ESOPHAGOGASTRODUODENOSCOPY Left 3/12/2020    Procedure: ESOPHAGOGASTRODUODENOSCOPY (EGD);  Surgeon: Maureen Santacruz MD;  Location: [facility];  Service: Endoscopy;  Laterality: Left;    TUBAL LIGATION       Review of patient's allergies indicates:   Allergen Reactions    Gluten protein     Sulfur      Current Outpatient Medications on File Prior to Visit   Medication Sig Dispense Refill    cetirizine (ZYRTEC) 10 MG tablet Take 1 tablet (10 mg total) by mouth every evening.  0  "   fluticasone propionate (FLONASE) 50 mcg/actuation nasal spray 2 sprays (100 mcg total) by Each Nostril route once daily. 18.2 mL 11    pseudoephedrine-ibuprofen (ADVIL COLD AND SINUS)  mg Cap Take by mouth.      [DISCONTINUED] methylPREDNISolone (MEDROL DOSEPACK) 4 mg tablet follow package directions 21 tablet 0    [DISCONTINUED] sertraline (ZOLOFT) 25 MG tablet TAKE 1 TABLET(25 MG) BY MOUTH EVERY EVENING 30 tablet 0     No current facility-administered medications on file prior to visit.     Social History     Socioeconomic History    Marital status:    Tobacco Use    Smoking status: Never Smoker    Smokeless tobacco: Never Used   Substance and Sexual Activity    Alcohol use: No    Drug use: No     Family History   Problem Relation Age of Onset    Lung cancer Mother     Heart failure Father     Anuerysm Father         abd    Seizures Paternal Aunt     Breast cancer Sister     Ovarian cancer Sister     Allergic rhinitis Neg Hx     Asthma Neg Hx     Atopy Neg Hx     Eczema Neg Hx     Immunodeficiency Neg Hx     Rhinitis Neg Hx     Urticaria Neg Hx     Angioedema Neg Hx     Allergies Neg Hx            Vitals:    03/08/22 1315   BP: 109/71   Pulse: 75   Temp: 97.5 °F (36.4 °C)   TempSrc: Temporal   Weight: 76.7 kg (169 lb)   Height: 5' 4" (1.626 m)         Physical Exam: See vital signs note   HEENT: Mild maxillary sinus tenderness. Nose mild congestion. Ears: Tympanic membranes intact.  No effusion or erythema. Throat mild erythema no exudate. Mild postnasal drainage.   Neck supple no adenopathy.    Chest: Clear to auscultation. Normal respiratory effort.   "

## 2022-08-31 DIAGNOSIS — Z12.31 OTHER SCREENING MAMMOGRAM: ICD-10-CM

## 2022-09-06 ENCOUNTER — TELEPHONE (OUTPATIENT)
Dept: FAMILY MEDICINE | Facility: CLINIC | Age: 62
End: 2022-09-06
Payer: COMMERCIAL

## 2022-09-07 ENCOUNTER — HOSPITAL ENCOUNTER (OUTPATIENT)
Dept: RADIOLOGY | Facility: HOSPITAL | Age: 62
Discharge: HOME | End: 2022-09-07
Attending: FAMILY MEDICINE
Payer: COMMERCIAL

## 2022-09-07 VITALS — BODY MASS INDEX: 28.85 KG/M2 | WEIGHT: 169 LBS | HEIGHT: 64 IN

## 2022-09-07 DIAGNOSIS — Z12.31 OTHER SCREENING MAMMOGRAM: ICD-10-CM

## 2022-09-07 PROCEDURE — 77063 MAMMO DIGITAL SCREENING BILAT WITH TOMO: ICD-10-PCS | Mod: 26,,, | Performed by: RADIOLOGY

## 2022-09-07 PROCEDURE — 77063 BREAST TOMOSYNTHESIS BI: CPT | Mod: TC,PO

## 2022-09-07 PROCEDURE — 77067 SCR MAMMO BI INCL CAD: CPT | Mod: 26,,, | Performed by: RADIOLOGY

## 2022-09-07 PROCEDURE — 77063 BREAST TOMOSYNTHESIS BI: CPT | Mod: 26,,, | Performed by: RADIOLOGY

## 2022-09-07 PROCEDURE — 77067 MAMMO DIGITAL SCREENING BILAT WITH TOMO: ICD-10-PCS | Mod: 26,,, | Performed by: RADIOLOGY

## 2022-09-08 ENCOUNTER — LAB VISIT (OUTPATIENT)
Dept: LAB | Facility: HOSPITAL | Age: 62
End: 2022-09-08
Attending: NURSE PRACTITIONER
Payer: COMMERCIAL

## 2022-09-08 ENCOUNTER — OFFICE VISIT (OUTPATIENT)
Dept: FAMILY MEDICINE | Facility: CLINIC | Age: 62
End: 2022-09-08
Payer: COMMERCIAL

## 2022-09-08 VITALS
SYSTOLIC BLOOD PRESSURE: 126 MMHG | HEART RATE: 58 BPM | DIASTOLIC BLOOD PRESSURE: 66 MMHG | HEIGHT: 64 IN | OXYGEN SATURATION: 98 % | TEMPERATURE: 98 F | BODY MASS INDEX: 28.51 KG/M2 | WEIGHT: 167 LBS

## 2022-09-08 DIAGNOSIS — Z00.00 ANNUAL PHYSICAL EXAM: Primary | ICD-10-CM

## 2022-09-08 DIAGNOSIS — Z13.220 SCREENING, LIPID: ICD-10-CM

## 2022-09-08 DIAGNOSIS — Z00.00 ANNUAL PHYSICAL EXAM: ICD-10-CM

## 2022-09-08 LAB
ALBUMIN SERPL BCP-MCNC: 4 G/DL (ref 3.5–5.2)
ALP SERPL-CCNC: 79 U/L (ref 55–135)
ALT SERPL W/O P-5'-P-CCNC: 17 U/L (ref 10–44)
ANION GAP SERPL CALC-SCNC: 9 MMOL/L (ref 8–16)
AST SERPL-CCNC: 20 U/L (ref 10–40)
BASOPHILS # BLD AUTO: 0.06 K/UL (ref 0–0.2)
BASOPHILS NFR BLD: 0.9 % (ref 0–1.9)
BILIRUB SERPL-MCNC: 0.4 MG/DL (ref 0.1–1)
BUN SERPL-MCNC: 16 MG/DL (ref 8–23)
CALCIUM SERPL-MCNC: 9.6 MG/DL (ref 8.7–10.5)
CHLORIDE SERPL-SCNC: 108 MMOL/L (ref 95–110)
CHOLEST SERPL-MCNC: 172 MG/DL (ref 120–199)
CHOLEST/HDLC SERPL: 2.9 {RATIO} (ref 2–5)
CO2 SERPL-SCNC: 23 MMOL/L (ref 23–29)
CREAT SERPL-MCNC: 1 MG/DL (ref 0.5–1.4)
DIFFERENTIAL METHOD: NORMAL
EOSINOPHIL # BLD AUTO: 0.3 K/UL (ref 0–0.5)
EOSINOPHIL NFR BLD: 5 % (ref 0–8)
ERYTHROCYTE [DISTWIDTH] IN BLOOD BY AUTOMATED COUNT: 14.4 % (ref 11.5–14.5)
EST. GFR  (NO RACE VARIABLE): >60 ML/MIN/1.73 M^2
GLUCOSE SERPL-MCNC: 91 MG/DL (ref 70–110)
HCT VFR BLD AUTO: 42.1 % (ref 37–48.5)
HDLC SERPL-MCNC: 59 MG/DL (ref 40–75)
HDLC SERPL: 34.3 % (ref 20–50)
HGB BLD-MCNC: 13.7 G/DL (ref 12–16)
IMM GRANULOCYTES # BLD AUTO: 0.02 K/UL (ref 0–0.04)
IMM GRANULOCYTES NFR BLD AUTO: 0.3 % (ref 0–0.5)
LDLC SERPL CALC-MCNC: 97.8 MG/DL (ref 63–159)
LYMPHOCYTES # BLD AUTO: 2.1 K/UL (ref 1–4.8)
LYMPHOCYTES NFR BLD: 31.9 % (ref 18–48)
MCH RBC QN AUTO: 29.8 PG (ref 27–31)
MCHC RBC AUTO-ENTMCNC: 32.5 G/DL (ref 32–36)
MCV RBC AUTO: 92 FL (ref 82–98)
MONOCYTES # BLD AUTO: 0.6 K/UL (ref 0.3–1)
MONOCYTES NFR BLD: 9.7 % (ref 4–15)
NEUTROPHILS # BLD AUTO: 3.5 K/UL (ref 1.8–7.7)
NEUTROPHILS NFR BLD: 52.2 % (ref 38–73)
NONHDLC SERPL-MCNC: 113 MG/DL
NRBC BLD-RTO: 0 /100 WBC
PLATELET # BLD AUTO: 275 K/UL (ref 150–450)
PMV BLD AUTO: 12.7 FL (ref 9.2–12.9)
POTASSIUM SERPL-SCNC: 4.7 MMOL/L (ref 3.5–5.1)
PROT SERPL-MCNC: 6.7 G/DL (ref 6–8.4)
RBC # BLD AUTO: 4.59 M/UL (ref 4–5.4)
SODIUM SERPL-SCNC: 140 MMOL/L (ref 136–145)
TRIGL SERPL-MCNC: 76 MG/DL (ref 30–150)
TSH SERPL DL<=0.005 MIU/L-ACNC: 3.32 UIU/ML (ref 0.4–4)
WBC # BLD AUTO: 6.62 K/UL (ref 3.9–12.7)

## 2022-09-08 PROCEDURE — 85025 COMPLETE CBC W/AUTO DIFF WBC: CPT | Performed by: NURSE PRACTITIONER

## 2022-09-08 PROCEDURE — 84443 ASSAY THYROID STIM HORMONE: CPT | Performed by: NURSE PRACTITIONER

## 2022-09-08 PROCEDURE — 3008F BODY MASS INDEX DOCD: CPT | Mod: CPTII,S$GLB,, | Performed by: NURSE PRACTITIONER

## 2022-09-08 PROCEDURE — 3074F PR MOST RECENT SYSTOLIC BLOOD PRESSURE < 130 MM HG: ICD-10-PCS | Mod: CPTII,S$GLB,, | Performed by: NURSE PRACTITIONER

## 2022-09-08 PROCEDURE — 3074F SYST BP LT 130 MM HG: CPT | Mod: CPTII,S$GLB,, | Performed by: NURSE PRACTITIONER

## 2022-09-08 PROCEDURE — 1160F PR REVIEW ALL MEDS BY PRESCRIBER/CLIN PHARMACIST DOCUMENTED: ICD-10-PCS | Mod: CPTII,S$GLB,, | Performed by: NURSE PRACTITIONER

## 2022-09-08 PROCEDURE — 99999 PR PBB SHADOW E&M-EST. PATIENT-LVL III: CPT | Mod: PBBFAC,,, | Performed by: NURSE PRACTITIONER

## 2022-09-08 PROCEDURE — 3078F PR MOST RECENT DIASTOLIC BLOOD PRESSURE < 80 MM HG: ICD-10-PCS | Mod: CPTII,S$GLB,, | Performed by: NURSE PRACTITIONER

## 2022-09-08 PROCEDURE — 1159F MED LIST DOCD IN RCRD: CPT | Mod: CPTII,S$GLB,, | Performed by: NURSE PRACTITIONER

## 2022-09-08 PROCEDURE — 80053 COMPREHEN METABOLIC PANEL: CPT | Performed by: NURSE PRACTITIONER

## 2022-09-08 PROCEDURE — 1159F PR MEDICATION LIST DOCUMENTED IN MEDICAL RECORD: ICD-10-PCS | Mod: CPTII,S$GLB,, | Performed by: NURSE PRACTITIONER

## 2022-09-08 PROCEDURE — 1160F RVW MEDS BY RX/DR IN RCRD: CPT | Mod: CPTII,S$GLB,, | Performed by: NURSE PRACTITIONER

## 2022-09-08 PROCEDURE — 80061 LIPID PANEL: CPT | Performed by: NURSE PRACTITIONER

## 2022-09-08 PROCEDURE — 99396 PR PREVENTIVE VISIT,EST,40-64: ICD-10-PCS | Mod: S$GLB,,, | Performed by: NURSE PRACTITIONER

## 2022-09-08 PROCEDURE — 3078F DIAST BP <80 MM HG: CPT | Mod: CPTII,S$GLB,, | Performed by: NURSE PRACTITIONER

## 2022-09-08 PROCEDURE — 99999 PR PBB SHADOW E&M-EST. PATIENT-LVL III: ICD-10-PCS | Mod: PBBFAC,,, | Performed by: NURSE PRACTITIONER

## 2022-09-08 PROCEDURE — 36415 COLL VENOUS BLD VENIPUNCTURE: CPT | Mod: PO | Performed by: NURSE PRACTITIONER

## 2022-09-08 PROCEDURE — 3008F PR BODY MASS INDEX (BMI) DOCUMENTED: ICD-10-PCS | Mod: CPTII,S$GLB,, | Performed by: NURSE PRACTITIONER

## 2022-09-08 PROCEDURE — 99396 PREV VISIT EST AGE 40-64: CPT | Mod: S$GLB,,, | Performed by: NURSE PRACTITIONER

## 2022-09-08 NOTE — PROGRESS NOTES
Subjective:       Patient ID: Meg Goff is a 62 y.o. female.    Chief Complaint: Annual Exam    HPI    She comes in for routine annual wellness exam with fasting labs. She has no chronic medical issues or long term medications. She denies shortness of breath or chest pain, no dizziness or syncope, no headaches or blurry vision. She is a non smoker.     Problem List Items Addressed This Visit    None  Visit Diagnoses       Annual physical exam    -  Primary    Relevant Orders    Lipid Panel    CBC Auto Differential    Comprehensive Metabolic Panel    TSH    Screening, lipid        Relevant Orders    Lipid Panel    CBC Auto Differential    Comprehensive Metabolic Panel    TSH             Review of Systems   Constitutional:  Negative for activity change, fatigue, fever and unexpected weight change.   HENT:  Negative for ear pain, hearing loss, rhinorrhea, sore throat and trouble swallowing.    Eyes:  Negative for pain, discharge and visual disturbance.   Respiratory:  Negative for cough, chest tightness, shortness of breath and wheezing.    Cardiovascular:  Negative for chest pain and palpitations.   Gastrointestinal:  Negative for abdominal pain, blood in stool, constipation, diarrhea and vomiting.   Endocrine: Negative for polydipsia and polyuria.   Genitourinary:  Negative for difficulty urinating, dysuria, hematuria and menstrual problem.   Musculoskeletal:  Negative for arthralgias, joint swelling, myalgias and neck pain.   Skin:  Negative for color change and rash.   Neurological:  Negative for dizziness, weakness and headaches.   Psychiatric/Behavioral:  Negative for confusion, dysphoric mood and sleep disturbance. The patient is not nervous/anxious.      Vitals:    09/08/22 0805   BP: 126/66   Pulse: (!) 58   Temp: 98 °F (36.7 °C)       Objective:     Current Outpatient Medications   Medication Sig Dispense Refill    cetirizine (ZYRTEC) 10 MG tablet Take 1 tablet (10 mg total) by mouth every evening.   0    fluticasone propionate (FLONASE) 50 mcg/actuation nasal spray 2 sprays (100 mcg total) by Each Nostril route once daily. 18.2 mL 11    pseudoephedrine-ibuprofen (ADVIL COLD AND SINUS)  mg Cap Take by mouth.       No current facility-administered medications for this visit.       Physical Exam  Vitals and nursing note reviewed.   Constitutional:       General: She is not in acute distress.     Appearance: She is well-developed.   HENT:      Head: Normocephalic and atraumatic.   Eyes:      Pupils: Pupils are equal, round, and reactive to light.   Cardiovascular:      Rate and Rhythm: Normal rate and regular rhythm.   Pulmonary:      Effort: Pulmonary effort is normal.      Breath sounds: Normal breath sounds.   Musculoskeletal:         General: Normal range of motion.      Cervical back: Normal range of motion and neck supple.   Skin:     General: Skin is warm and dry.      Findings: No rash.   Neurological:      Mental Status: She is alert and oriented to person, place, and time.   Psychiatric:         Judgment: Judgment normal.       Assessment:       1. Annual physical exam    2. Screening, lipid          Plan:   Annual physical exam  -     Lipid Panel; Future; Expected date: 09/08/2022  -     CBC Auto Differential; Future; Expected date: 09/08/2022  -     Comprehensive Metabolic Panel; Future; Expected date: 09/08/2022  -     TSH; Future; Expected date: 09/08/2022    Screening, lipid  -     Lipid Panel; Future; Expected date: 09/08/2022  -     CBC Auto Differential; Future; Expected date: 09/08/2022  -     Comprehensive Metabolic Panel; Future; Expected date: 09/08/2022  -     TSH; Future; Expected date: 09/08/2022        No follow-ups on file.    There are no Patient Instructions on file for this visit.

## 2022-10-21 ENCOUNTER — OFFICE VISIT (OUTPATIENT)
Dept: FAMILY MEDICINE | Facility: CLINIC | Age: 62
End: 2022-10-21
Payer: COMMERCIAL

## 2022-10-21 VITALS
TEMPERATURE: 98 F | WEIGHT: 169 LBS | OXYGEN SATURATION: 98 % | DIASTOLIC BLOOD PRESSURE: 72 MMHG | BODY MASS INDEX: 28.85 KG/M2 | SYSTOLIC BLOOD PRESSURE: 139 MMHG | HEIGHT: 64 IN | HEART RATE: 68 BPM

## 2022-10-21 DIAGNOSIS — R03.0 ELEVATED BLOOD-PRESSURE READING WITHOUT DIAGNOSIS OF HYPERTENSION: Primary | ICD-10-CM

## 2022-10-21 PROCEDURE — 3075F PR MOST RECENT SYSTOLIC BLOOD PRESS GE 130-139MM HG: ICD-10-PCS | Mod: CPTII,S$GLB,, | Performed by: FAMILY MEDICINE

## 2022-10-21 PROCEDURE — 90471 IMMUNIZATION ADMIN: CPT | Mod: S$GLB,,, | Performed by: FAMILY MEDICINE

## 2022-10-21 PROCEDURE — 3078F DIAST BP <80 MM HG: CPT | Mod: CPTII,S$GLB,, | Performed by: FAMILY MEDICINE

## 2022-10-21 PROCEDURE — 1159F PR MEDICATION LIST DOCUMENTED IN MEDICAL RECORD: ICD-10-PCS | Mod: CPTII,S$GLB,, | Performed by: FAMILY MEDICINE

## 2022-10-21 PROCEDURE — 99999 PR PBB SHADOW E&M-EST. PATIENT-LVL III: ICD-10-PCS | Mod: PBBFAC,,, | Performed by: FAMILY MEDICINE

## 2022-10-21 PROCEDURE — 99213 PR OFFICE/OUTPT VISIT, EST, LEVL III, 20-29 MIN: ICD-10-PCS | Mod: 25,S$GLB,, | Performed by: FAMILY MEDICINE

## 2022-10-21 PROCEDURE — 3078F PR MOST RECENT DIASTOLIC BLOOD PRESSURE < 80 MM HG: ICD-10-PCS | Mod: CPTII,S$GLB,, | Performed by: FAMILY MEDICINE

## 2022-10-21 PROCEDURE — 1159F MED LIST DOCD IN RCRD: CPT | Mod: CPTII,S$GLB,, | Performed by: FAMILY MEDICINE

## 2022-10-21 PROCEDURE — 3075F SYST BP GE 130 - 139MM HG: CPT | Mod: CPTII,S$GLB,, | Performed by: FAMILY MEDICINE

## 2022-10-21 PROCEDURE — 99213 OFFICE O/P EST LOW 20 MIN: CPT | Mod: 25,S$GLB,, | Performed by: FAMILY MEDICINE

## 2022-10-21 PROCEDURE — 90471 ZOSTER RECOMBINANT VACCINE: ICD-10-PCS | Mod: S$GLB,,, | Performed by: FAMILY MEDICINE

## 2022-10-21 PROCEDURE — 90750 ZOSTER RECOMBINANT VACCINE: ICD-10-PCS | Mod: S$GLB,,, | Performed by: FAMILY MEDICINE

## 2022-10-21 PROCEDURE — 99999 PR PBB SHADOW E&M-EST. PATIENT-LVL III: CPT | Mod: PBBFAC,,, | Performed by: FAMILY MEDICINE

## 2022-10-21 PROCEDURE — 90750 HZV VACC RECOMBINANT IM: CPT | Mod: S$GLB,,, | Performed by: FAMILY MEDICINE

## 2022-10-21 NOTE — PATIENT INSTRUCTIONS
Please monitor blood pressure and pulse once or twice a day and write down readings. Bring numbers and machine in when you come in for blood pressure check with the nurse.

## 2022-10-21 NOTE — PROGRESS NOTES
Recently had blood pressure elevated when she had an urgent care visit for upper respiratory symptoms.  She was taking some over-the-counter decongestants.  Feeling better.  Occasional ear discomfort.  Blood pressure has still been elevated at home.  No prior diagnosis hypertension.          Meg was seen today for follow-up and hypertension.    Diagnoses and all orders for this visit:    Elevated blood-pressure reading without diagnosis of hypertension    Other orders  -     Zoster Recombinant Vaccine  -     Zoster Recombinant Vaccine; Future  -     Zoster Recombinant Vaccine    Avoid decongestants.  Monitor blood pressure at home.  Watch diet.  Recheck blood pressure with nurse in 2 weeks.  Further treatment pending those results.            Past Medical History:  Past Medical History:   Diagnosis Date    Celiac disease     DDD (degenerative disc disease), cervical     Duodenitis     Frozen shoulder syndrome     H. pylori infection     Major depressive disorder with current active episode 2021    Menopausal symptoms      Past Surgical History:   Procedure Laterality Date     SECTION      COLONOSCOPY      COLONOSCOPY N/A 2016    Procedure: COLONOSCOPY;  Surgeon: Roberta Boateng MD;  Location: [facility];  Service: Endoscopy;  Laterality: N/A;    ESOPHAGOGASTRODUODENOSCOPY  [id number]    ESOPHAGOGASTRODUODENOSCOPY Left 3/12/2020    Procedure: ESOPHAGOGASTRODUODENOSCOPY (EGD);  Surgeon: Maureen Santacruz MD;  Location: [facility];  Service: Endoscopy;  Laterality: Left;    TUBAL LIGATION       Review of patient's allergies indicates:   Allergen Reactions    Gluten protein     Sulfur      Current Outpatient Medications on File Prior to Visit   Medication Sig Dispense Refill    fluticasone propionate (FLONASE) 50 mcg/actuation nasal spray 2 sprays (100 mcg total) by Each Nostril route once daily. 18.2 mL 11    [DISCONTINUED] cetirizine (ZYRTEC) 10 MG tablet Take 1 tablet (10 mg total) by mouth  "every evening.  0    [DISCONTINUED] pseudoephedrine-ibuprofen (ADVIL COLD AND SINUS)  mg Cap Take by mouth.       No current facility-administered medications on file prior to visit.     Social History     Socioeconomic History    Marital status:    Tobacco Use    Smoking status: Never    Smokeless tobacco: Never   Substance and Sexual Activity    Alcohol use: No    Drug use: No     Family History   Problem Relation Age of Onset    Lung cancer Mother     Heart failure Father     Anuerysm Father         abd    Seizures Paternal Aunt     Breast cancer Sister     Ovarian cancer Sister     Allergic rhinitis Neg Hx     Asthma Neg Hx     Atopy Neg Hx     Eczema Neg Hx     Immunodeficiency Neg Hx     Rhinitis Neg Hx     Urticaria Neg Hx     Angioedema Neg Hx     Allergies Neg Hx            ROS:  GENERAL: No fever, chills,  or significant weight changes.   CARDIOVASCULAR: Denies chest pain, PND, orthopnea or reduced exercise tolerance.  ABDOMEN: Appetite fine. Denies diarrhea, abdominal pain, hematemesis or blood in stool.  URINARY: No flank pain, dysuria or hematuria.    Vitals:    10/21/22 0919   BP: 139/72   Pulse: 68   Temp: 97.7 °F (36.5 °C)   TempSrc: Temporal   SpO2: 98%   Weight: 76.6 kg (168 lb 15.7 oz)   Height: 5' 4" (1.626 m)       Wt Readings from Last 3 Encounters:   10/21/22 76.6 kg (168 lb 15.7 oz)   09/08/22 75.8 kg (167 lb)   09/07/22 76.7 kg (169 lb)       APPEARANCE: Well nourished, well developed, in no acute distress.    HEAD: Normocephalic.  Atraumatic.  Sinuses nontender  Ears:  Tympanic membranes intact.  No effusion or erythema.  Nose clear  Throat no erythema or exudate  EYES:   Right eye: Pupil reactive.  Conjunctiva clear.    Left eye: Pupil reactive.  Conjunctiva clear.    NECK: Supple. No bruits.  No JVD.  No cervical lymphadenopathy.  No thyromegaly.    CHEST: Breath sounds clear bilaterally.  Normal respiratory effort  CARDIOVASCULAR: Normal rate.  Regular rhythm.  No murmurs.  " No rub.  No gallops.   No edema.  MENTAL STATUS: Alert.  Oriented x 3.

## 2022-12-27 ENCOUNTER — TELEPHONE (OUTPATIENT)
Dept: FAMILY MEDICINE | Facility: CLINIC | Age: 62
End: 2022-12-27
Payer: COMMERCIAL

## 2022-12-27 NOTE — TELEPHONE ENCOUNTER
----- Message from Jessica Malone sent at 12/27/2022 12:13 PM CST -----  Contact: self [phone]  Pt is calling regarding nurse visit . Please call back at [phone]/[employer] . Thanks/dj

## 2022-12-27 NOTE — TELEPHONE ENCOUNTER
----- Message from Maninder Lindsay sent at 12/27/2022  1:34 PM CST -----  Contact: self  Pt missed the call pls call back . She Is trying to get a nurse visit r/s [phone]   Thank you

## 2023-01-10 ENCOUNTER — CLINICAL SUPPORT (OUTPATIENT)
Dept: FAMILY MEDICINE | Facility: CLINIC | Age: 63
End: 2023-01-10
Payer: COMMERCIAL

## 2023-01-10 DIAGNOSIS — Z23 IMMUNIZATION DUE: Primary | ICD-10-CM

## 2023-01-10 PROCEDURE — 90471 IMMUNIZATION ADMIN: CPT | Mod: S$GLB,,, | Performed by: FAMILY MEDICINE

## 2023-01-10 PROCEDURE — 90471 ZOSTER RECOMBINANT VACCINE: ICD-10-PCS | Mod: S$GLB,,, | Performed by: FAMILY MEDICINE

## 2023-01-10 PROCEDURE — 90750 ZOSTER RECOMBINANT VACCINE: ICD-10-PCS | Mod: S$GLB,,, | Performed by: FAMILY MEDICINE

## 2023-01-10 PROCEDURE — 99999 PR PBB SHADOW E&M-EST. PATIENT-LVL I: CPT | Mod: PBBFAC,,,

## 2023-01-10 PROCEDURE — 90750 HZV VACC RECOMBINANT IM: CPT | Mod: S$GLB,,, | Performed by: FAMILY MEDICINE

## 2023-01-10 PROCEDURE — 99999 PR PBB SHADOW E&M-EST. PATIENT-LVL I: ICD-10-PCS | Mod: PBBFAC,,,

## 2023-02-08 ENCOUNTER — OFFICE VISIT (OUTPATIENT)
Dept: FAMILY MEDICINE | Facility: CLINIC | Age: 63
End: 2023-02-08
Payer: COMMERCIAL

## 2023-02-08 VITALS
WEIGHT: 171.81 LBS | TEMPERATURE: 98 F | SYSTOLIC BLOOD PRESSURE: 135 MMHG | DIASTOLIC BLOOD PRESSURE: 80 MMHG | HEIGHT: 64 IN | HEART RATE: 70 BPM | BODY MASS INDEX: 29.33 KG/M2

## 2023-02-08 DIAGNOSIS — R42 VERTIGO: Primary | ICD-10-CM

## 2023-02-08 PROCEDURE — 3008F PR BODY MASS INDEX (BMI) DOCUMENTED: ICD-10-PCS | Mod: CPTII,S$GLB,, | Performed by: FAMILY MEDICINE

## 2023-02-08 PROCEDURE — 99213 OFFICE O/P EST LOW 20 MIN: CPT | Mod: S$GLB,,, | Performed by: FAMILY MEDICINE

## 2023-02-08 PROCEDURE — 3008F BODY MASS INDEX DOCD: CPT | Mod: CPTII,S$GLB,, | Performed by: FAMILY MEDICINE

## 2023-02-08 PROCEDURE — 99999 PR PBB SHADOW E&M-EST. PATIENT-LVL III: ICD-10-PCS | Mod: PBBFAC,,, | Performed by: FAMILY MEDICINE

## 2023-02-08 PROCEDURE — 3075F PR MOST RECENT SYSTOLIC BLOOD PRESS GE 130-139MM HG: ICD-10-PCS | Mod: CPTII,S$GLB,, | Performed by: FAMILY MEDICINE

## 2023-02-08 PROCEDURE — 1159F MED LIST DOCD IN RCRD: CPT | Mod: CPTII,S$GLB,, | Performed by: FAMILY MEDICINE

## 2023-02-08 PROCEDURE — 99999 PR PBB SHADOW E&M-EST. PATIENT-LVL III: CPT | Mod: PBBFAC,,, | Performed by: FAMILY MEDICINE

## 2023-02-08 PROCEDURE — 3079F DIAST BP 80-89 MM HG: CPT | Mod: CPTII,S$GLB,, | Performed by: FAMILY MEDICINE

## 2023-02-08 PROCEDURE — 3079F PR MOST RECENT DIASTOLIC BLOOD PRESSURE 80-89 MM HG: ICD-10-PCS | Mod: CPTII,S$GLB,, | Performed by: FAMILY MEDICINE

## 2023-02-08 PROCEDURE — 3075F SYST BP GE 130 - 139MM HG: CPT | Mod: CPTII,S$GLB,, | Performed by: FAMILY MEDICINE

## 2023-02-08 PROCEDURE — 99213 PR OFFICE/OUTPT VISIT, EST, LEVL III, 20-29 MIN: ICD-10-PCS | Mod: S$GLB,,, | Performed by: FAMILY MEDICINE

## 2023-02-08 PROCEDURE — 1159F PR MEDICATION LIST DOCUMENTED IN MEDICAL RECORD: ICD-10-PCS | Mod: CPTII,S$GLB,, | Performed by: FAMILY MEDICINE

## 2023-02-08 RX ORDER — PNV NO.95/FERROUS FUM/FOLIC AC 28MG-0.8MG
TABLET ORAL
COMMUNITY
End: 2024-03-25

## 2023-02-08 RX ORDER — MECLIZINE HYDROCHLORIDE 25 MG/1
25 TABLET ORAL 3 TIMES DAILY PRN
Qty: 30 TABLET | Refills: 0 | Status: SHIPPED | OUTPATIENT
Start: 2023-02-08 | End: 2023-03-29

## 2023-02-08 NOTE — PROGRESS NOTES
Patient states that she developed some vertigo yesterday when she was bending over.  She had symptoms off and on with either looking up or turning over in bed.  Lasted over a couple of hours.  She had an episode of nausea and vomiting.  Symptoms resolved.  She did have mild headache.  Today she has noticed some recurrent intermittent vertigo with turning over etc..  She denies any focal weakness numbness or paresthesias.  No headache currently.  No decreased hearing or otalgia.  No tinnitus.  No palpitation.  She is not having symptoms at present.    Meg was seen today for otalgia.    Diagnoses and all orders for this visit:    Vertigo    Other orders  -     meclizine (ANTIVERT) 25 mg tablet; Take 1 tablet (25 mg total) by mouth 3 (three) times daily as needed for Dizziness or Nausea.    Sounds like she may have had some benign positional vertigo or could have mild vestibulitis.  Symptoms not reproducible or present currently.  Will treat as above.  Follow-up if recurring symptoms.  Severe worsening symptoms go to ER.  If symptoms persist past 2 weeks ago to ENT.              Past Medical History:  Past Medical History:   Diagnosis Date    Celiac disease     DDD (degenerative disc disease), cervical     Duodenitis     Frozen shoulder syndrome     H. pylori infection     Major depressive disorder with current active episode 2021    Menopausal symptoms      Past Surgical History:   Procedure Laterality Date     SECTION      COLONOSCOPY      COLONOSCOPY N/A 2016    Procedure: COLONOSCOPY;  Surgeon: Roberta Boateng MD;  Location: [facility];  Service: Endoscopy;  Laterality: N/A;    ESOPHAGOGASTRODUODENOSCOPY  [id number]    ESOPHAGOGASTRODUODENOSCOPY Left 3/12/2020    Procedure: ESOPHAGOGASTRODUODENOSCOPY (EGD);  Surgeon: Maureen Santacruz MD;  Location: [facility];  Service: Endoscopy;  Laterality: Left;    TUBAL LIGATION       Review of patient's allergies indicates:   Allergen Reactions     Gluten protein     Sulfur      Current Outpatient Medications on File Prior to Visit   Medication Sig Dispense Refill    ascorbic acid, vitamin C, (VITAMIN C) 100 MG tablet Vitamin C Take No date recorded No form recorded No frequency recorded No route recorded No set duration recorded No set duration amount recorded active No dosage strength recorded No dosage strength units of measure recorded      cyanocobalamin (VITAMIN B-12) 100 MCG tablet Vitamin B-12 Take No date recorded No form recorded No frequency recorded No route recorded No set duration recorded No set duration amount recorded active No dosage strength recorded No dosage strength units of measure recorded      fluticasone propionate (FLONASE) 50 mcg/actuation nasal spray 2 sprays (100 mcg total) by Each Nostril route once daily. 18.2 mL 11     No current facility-administered medications on file prior to visit.     Social History     Socioeconomic History    Marital status:    Tobacco Use    Smoking status: Never    Smokeless tobacco: Never   Substance and Sexual Activity    Alcohol use: No    Drug use: No     Family History   Problem Relation Age of Onset    Lung cancer Mother     Heart failure Father     Anuerysm Father         abd    Seizures Paternal Aunt     Breast cancer Sister     Ovarian cancer Sister     Allergic rhinitis Neg Hx     Asthma Neg Hx     Atopy Neg Hx     Eczema Neg Hx     Immunodeficiency Neg Hx     Rhinitis Neg Hx     Urticaria Neg Hx     Angioedema Neg Hx     Allergies Neg Hx      Answers submitted by the patient for this visit:  Review of Systems Questionnaire (Submitted on 2/8/2023)  activity change: No  hearing loss: No  trouble swallowing: No  eye discharge: No  chest tightness: No  wheezing: No  chest pain: No  palpitations: No  constipation: No  vomiting: No  diarrhea: No  difficulty urinating: No  hematuria: No  menstrual problem: No  headaches: Yes  dysphoric mood: No      Vitals:    02/08/23 1445   BP: 135/80  "  Pulse: 70   Temp: 97.9 °F (36.6 °C)   TempSrc: Temporal   Weight: 77.9 kg (171 lb 12.8 oz)   Height: 5' 4" (1.626 m)     Wt Readings from Last 3 Encounters:   02/08/23 77.9 kg (171 lb 12.8 oz)   10/21/22 76.6 kg (168 lb 15.7 oz)   09/08/22 75.8 kg (167 lb)       OBJECTIVE:   APPEARANCE: Well nourished, well developed, in no acute distress.    HEAD: Normocephalic.  Atraumatic.  No sinus tenderness.  EYES:   Right eye: Pupil reactive.  Conjunctiva clear.    Left eye: Pupil reactive.  Conjunctiva clear.  EOMI.    EARS: TM's intact. Light reflex normal. No retraction or perforation.    NOSE:  clear.  MOUTH & THROAT:  No pharyngeal erythema or exudate. No lesions.  NECK: Supple. No bruits.  No JVD.  No cervical lymphadenopathy.  No thyromegaly.    CHEST: Breath sounds clear bilaterally.  Normal respiratory effort  CARDIOVASCULAR: Normal rate.  Regular rhythm.  No murmurs.  No rub.  No gallops.  ABDOMEN: Bowel sounds normal.  Soft.  No tenderness.  No organomegaly.  PERIPHERAL VASCULAR: No cyanosis.  No clubbing.  No edema.  MENTAL STATUS: Alert.  Oriented x 3.  NEUROLOGIC: Cranial nerves two through 12 are intact. Motor strength is normal in the upper and lower extremities proximally and distally.  No pronator drift.  Deep tendon reflexes are intact. Sensation intact. Gait is normal. Tandem gait is normal. Negative Romberg. Rapid alternating movements and finger-to-nose movements are normal.  [place]-Hallpike maneuver is negative with no nystagmus or symptoms reproduced.        "

## 2023-08-14 ENCOUNTER — PATIENT MESSAGE (OUTPATIENT)
Dept: ADMINISTRATIVE | Facility: HOSPITAL | Age: 63
End: 2023-08-14
Payer: COMMERCIAL

## 2023-08-14 DIAGNOSIS — Z12.31 ENCOUNTER FOR SCREENING MAMMOGRAM FOR MALIGNANT NEOPLASM OF BREAST: Primary | ICD-10-CM

## 2023-08-15 ENCOUNTER — PATIENT OUTREACH (OUTPATIENT)
Dept: ADMINISTRATIVE | Facility: HOSPITAL | Age: 63
End: 2023-08-15
Payer: COMMERCIAL

## 2023-09-11 ENCOUNTER — HOSPITAL ENCOUNTER (OUTPATIENT)
Dept: RADIOLOGY | Facility: HOSPITAL | Age: 63
Discharge: HOME | End: 2023-09-11
Attending: FAMILY MEDICINE
Payer: COMMERCIAL

## 2023-09-11 DIAGNOSIS — Z12.31 ENCOUNTER FOR SCREENING MAMMOGRAM FOR MALIGNANT NEOPLASM OF BREAST: ICD-10-CM

## 2023-09-11 PROCEDURE — 77063 BREAST TOMOSYNTHESIS BI: CPT | Mod: 26,,, | Performed by: RADIOLOGY

## 2023-09-11 PROCEDURE — 77067 SCR MAMMO BI INCL CAD: CPT | Mod: TC,PO

## 2023-09-11 PROCEDURE — 77067 MAMMO DIGITAL SCREENING BILAT WITH TOMO: ICD-10-PCS | Mod: 26,,, | Performed by: RADIOLOGY

## 2023-09-11 PROCEDURE — 77063 MAMMO DIGITAL SCREENING BILAT WITH TOMO: ICD-10-PCS | Mod: 26,,, | Performed by: RADIOLOGY

## 2023-09-11 PROCEDURE — 77067 SCR MAMMO BI INCL CAD: CPT | Mod: 26,,, | Performed by: RADIOLOGY

## 2023-09-27 ENCOUNTER — LAB VISIT (OUTPATIENT)
Dept: LAB | Facility: HOSPITAL | Age: 63
End: 2023-09-27
Attending: FAMILY MEDICINE
Payer: COMMERCIAL

## 2023-09-27 ENCOUNTER — OFFICE VISIT (OUTPATIENT)
Dept: FAMILY MEDICINE | Facility: CLINIC | Age: 63
End: 2023-09-27
Payer: COMMERCIAL

## 2023-09-27 VITALS
WEIGHT: 163.19 LBS | BODY MASS INDEX: 27.86 KG/M2 | HEIGHT: 64 IN | DIASTOLIC BLOOD PRESSURE: 76 MMHG | TEMPERATURE: 97 F | HEART RATE: 60 BPM | SYSTOLIC BLOOD PRESSURE: 134 MMHG

## 2023-09-27 DIAGNOSIS — L20.9 ATOPIC DERMATITIS, UNSPECIFIED TYPE: ICD-10-CM

## 2023-09-27 DIAGNOSIS — R19.7 DIARRHEA, UNSPECIFIED TYPE: ICD-10-CM

## 2023-09-27 DIAGNOSIS — K90.0 CELIAC DISEASE: ICD-10-CM

## 2023-09-27 DIAGNOSIS — R19.7 DIARRHEA, UNSPECIFIED TYPE: Primary | ICD-10-CM

## 2023-09-27 DIAGNOSIS — R10.32 LEFT LOWER QUADRANT PAIN: ICD-10-CM

## 2023-09-27 LAB — IGA SERPL-MCNC: 133 MG/DL (ref 40–350)

## 2023-09-27 PROCEDURE — 3078F PR MOST RECENT DIASTOLIC BLOOD PRESSURE < 80 MM HG: ICD-10-PCS | Mod: CPTII,S$GLB,, | Performed by: FAMILY MEDICINE

## 2023-09-27 PROCEDURE — 3078F DIAST BP <80 MM HG: CPT | Mod: CPTII,S$GLB,, | Performed by: FAMILY MEDICINE

## 2023-09-27 PROCEDURE — 99999 PR PBB SHADOW E&M-EST. PATIENT-LVL V: CPT | Mod: PBBFAC,,, | Performed by: FAMILY MEDICINE

## 2023-09-27 PROCEDURE — 3008F PR BODY MASS INDEX (BMI) DOCUMENTED: ICD-10-PCS | Mod: CPTII,S$GLB,, | Performed by: FAMILY MEDICINE

## 2023-09-27 PROCEDURE — 3008F BODY MASS INDEX DOCD: CPT | Mod: CPTII,S$GLB,, | Performed by: FAMILY MEDICINE

## 2023-09-27 PROCEDURE — 99999 PR PBB SHADOW E&M-EST. PATIENT-LVL V: ICD-10-PCS | Mod: PBBFAC,,, | Performed by: FAMILY MEDICINE

## 2023-09-27 PROCEDURE — 1159F MED LIST DOCD IN RCRD: CPT | Mod: CPTII,S$GLB,, | Performed by: FAMILY MEDICINE

## 2023-09-27 PROCEDURE — 82784 ASSAY IGA/IGD/IGG/IGM EACH: CPT | Performed by: FAMILY MEDICINE

## 2023-09-27 PROCEDURE — 3075F SYST BP GE 130 - 139MM HG: CPT | Mod: CPTII,S$GLB,, | Performed by: FAMILY MEDICINE

## 2023-09-27 PROCEDURE — 86364 TISS TRNSGLTMNASE EA IG CLAS: CPT | Performed by: FAMILY MEDICINE

## 2023-09-27 PROCEDURE — 99214 PR OFFICE/OUTPT VISIT, EST, LEVL IV, 30-39 MIN: ICD-10-PCS | Mod: S$GLB,,, | Performed by: FAMILY MEDICINE

## 2023-09-27 PROCEDURE — 36415 COLL VENOUS BLD VENIPUNCTURE: CPT | Mod: PO | Performed by: FAMILY MEDICINE

## 2023-09-27 PROCEDURE — 3075F PR MOST RECENT SYSTOLIC BLOOD PRESS GE 130-139MM HG: ICD-10-PCS | Mod: CPTII,S$GLB,, | Performed by: FAMILY MEDICINE

## 2023-09-27 PROCEDURE — 1159F PR MEDICATION LIST DOCUMENTED IN MEDICAL RECORD: ICD-10-PCS | Mod: CPTII,S$GLB,, | Performed by: FAMILY MEDICINE

## 2023-09-27 PROCEDURE — 99214 OFFICE O/P EST MOD 30 MIN: CPT | Mod: S$GLB,,, | Performed by: FAMILY MEDICINE

## 2023-09-27 RX ORDER — DIPHENOXYLATE HYDROCHLORIDE AND ATROPINE SULFATE 2.5; .025 MG/1; MG/1
1 TABLET ORAL 4 TIMES DAILY PRN
Qty: 20 TABLET | Refills: 0 | Status: SHIPPED | OUTPATIENT
Start: 2023-09-27 | End: 2023-10-07

## 2023-09-27 NOTE — PATIENT INSTRUCTIONS
Isaac Weaver,     If you are due for any health screening(s) below please notify me so we can arrange them to be ordered and scheduled. Most healthy patients at your age complete them, but you are free to accept or refuse.     If you can't do it, I'll definitely understand. If you can, I'd certainly appreciate it!    All of your core healthy metrics are met.

## 2023-09-27 NOTE — PROGRESS NOTES
Patient states that she started developing some soft green to dark brown discolored bowel movements about 3 weeks ago.  Past week she developed some intermittent abdominal cramps mostly left lower abdomen as well as some diarrhea occasionally liquid.  Symptoms intermittent.  May be worse after she eats.  She is had some aches or chills but no fever.  She had some nausea without vomiting.  She denies any recent antibiotic use.  No sick contacts.  No recent travel.  She is on Dupixent for the past several months for eczema.  She does have previous celiac disease.  States has been watching her diet.  States symptoms not similar to when she had celiac flares in the past.    Meg was seen today for nausea, diarrhea and abdominal cramping.    Diagnoses and all orders for this visit:    Diarrhea, unspecified type  -     Tissue transglutaminase, IgA; Future  -     Ambulatory referral/consult to Gastroenterology; Future  -     Clostridium difficile EIA; Future  -     Giardia / Cryptosporidum, EIA; Future  -     Stool culture; Future  -     WBC, Stool; Future  -     Stool Exam-Ova,Cysts,Parasites; Future    Atopic dermatitis, unspecified type  -     Ambulatory referral/consult to Gastroenterology; Future    Celiac disease  -     Tissue transglutaminase, IgA; Future  -     IgA; Future  -     Ambulatory referral/consult to Gastroenterology; Future    Left lower quadrant pain  -     CT Abdomen Pelvis  Without Contrast; Future    Other orders  -     diphenoxylate-atropine 2.5-0.025 mg (LOMOTIL) 2.5-0.025 mg per tablet; Take 1 tablet by mouth 4 (four) times daily as needed for Diarrhea.      Obtain evaluation as above.  CT rule out diverticulitis she does have prior diverticulosis on lower endoscopy.            Past Medical History:  Past Medical History:   Diagnosis Date    Atopic dermatitis 9/27/2023    Celiac disease     DDD (degenerative disc disease), cervical     Duodenitis     Frozen shoulder syndrome     H. pylori  infection     Major depressive disorder with current active episode 2021    Menopausal symptoms      Past Surgical History:   Procedure Laterality Date     SECTION      COLONOSCOPY      COLONOSCOPY N/A 2016    Procedure: COLONOSCOPY;  Surgeon: Roberta Boateng MD;  Location: [facility];  Service: Endoscopy;  Laterality: N/A;    ESOPHAGOGASTRODUODENOSCOPY  [id number]    ESOPHAGOGASTRODUODENOSCOPY Left 3/12/2020    Procedure: ESOPHAGOGASTRODUODENOSCOPY (EGD);  Surgeon: Maureen Santacruz MD;  Location: [facility];  Service: Endoscopy;  Laterality: Left;    TUBAL LIGATION       Review of patient's allergies indicates:   Allergen Reactions    Gluten protein     Sulfa (sulfonamide antibiotics)     Sulfur      Current Outpatient Medications on File Prior to Visit   Medication Sig Dispense Refill    ascorbic acid, vitamin C, (VITAMIN C) 100 MG tablet Vitamin C Take No date recorded No form recorded No frequency recorded No route recorded No set duration recorded No set duration amount recorded active No dosage strength recorded No dosage strength units of measure recorded      cetirizine (ZYRTEC) 10 MG tablet Take 10 mg by mouth once daily.      cyanocobalamin (VITAMIN B-12) 100 MCG tablet Vitamin B-12 Take No date recorded No form recorded No frequency recorded No route recorded No set duration recorded No set duration amount recorded active No dosage strength recorded No dosage strength units of measure recorded      dupilumab (DUPIXENT PEN) 300 mg/2 mL PnIj Inject 300 mg into the skin every 14 (fourteen) days.      fluticasone propionate (FLONASE) 50 mcg/actuation nasal spray 2 sprays (100 mcg total) by Each Nostril route once daily. 18.2 mL 11    pimecrolimus (ELIDEL) 1 % cream Apply topically 2 (two) times daily. 60 g 1    triamcinolone acetonide 0.1% (KENALOG) 0.1 % cream Apply to AA on body BID x 2-3 weeks, then take a break-use as needed for flares. 454 g 2     No current facility-administered  "medications on file prior to visit.     Social History     Socioeconomic History    Marital status:    Tobacco Use    Smoking status: Never    Smokeless tobacco: Never   Substance and Sexual Activity    Alcohol use: No    Drug use: No     Family History   Problem Relation Age of Onset    Lung cancer Mother     Heart failure Father     Anuerysm Father         abd    Seizures Paternal Aunt     Breast cancer Sister     Ovarian cancer Sister     Allergic rhinitis Neg Hx     Asthma Neg Hx     Atopy Neg Hx     Eczema Neg Hx     Immunodeficiency Neg Hx     Rhinitis Neg Hx     Urticaria Neg Hx     Angioedema Neg Hx     Allergies Neg Hx          Answers submitted by the patient for this visit:  Abdominal Pain Questionnaire (Submitted on 9/27/2023)  Chief Complaint: Abdominal pain  Onset: 1 to 4 weeks ago  Onset quality: sudden  Frequency: daily  Episode duration: 3 Weeks  Progression since onset: waxing and waning  Pain location: LLQ, left flank  Pain - numeric: 3/10  Pain quality: cramping  anorexia: No  arthralgias: Yes  belching: No  constipation: No  diarrhea: Yes  dysuria: No  fever: No  flatus: No  frequency: No  headaches: No  hematochezia: No  hematuria: No  melena: Yes  myalgias: Yes  nausea: Yes  weight loss: Yes  vomiting: No  Aggravated by: eating  Relieved by: nothing  Pain severity: moderate  Treatments tried: antacids  Improvement on treatment: mild  abdominal surgery: No  colon cancer: No  Crohn's disease: No  gallstones: No  GERD: No  irritable bowel syndrome: No  kidney stones: No  pancreatitis: No  PUD: No  ulcerative colitis: No  UTI: No      Vitals:    09/27/23 1317   BP: 134/76   Pulse: 60   Temp: 97 °F (36.1 °C)   TempSrc: Temporal   Weight: 74 kg (163 lb 3.2 oz)   Height: 5' 4" (1.626 m)     Wt Readings from Last 3 Encounters:   09/27/23 74 kg (163 lb 3.2 oz)   08/04/23 75.8 kg (167 lb)   06/01/23 76.2 kg (168 lb)       OBJECTIVE:   APPEARANCE: Well nourished, well developed, in no acute " distress.    HEAD: Normocephalic.  Atraumatic.  No sinus tenderness.  EYES:   Right eye: Pupil reactive.  Conjunctiva clear.    Left eye: Pupil reactive.  Conjunctiva clear.  EOMI.    EARS: TM's intact. Light reflex normal. No retraction or perforation.    NOSE:  clear.  MOUTH & THROAT:  No pharyngeal erythema or exudate. No lesions.  NECK: Supple. No bruits.  No JVD.  No cervical lymphadenopathy.  No thyromegaly.    CHEST: Breath sounds clear bilaterally.  Normal respiratory effort  CARDIOVASCULAR: Normal rate.  Regular rhythm.  No murmurs.  No rub.  No gallops.  ABDOMEN: Bowel sounds normal.  Soft.  Minimal tenderness left lower quadrant.  No rebound.  No guarding.  No organomegaly.  PERIPHERAL VASCULAR: No cyanosis.  No clubbing.  No edema.  NEUROLOGIC: No focal findings.  MENTAL STATUS: Alert.  Oriented x 3.

## 2023-09-28 ENCOUNTER — LAB VISIT (OUTPATIENT)
Dept: LAB | Facility: HOSPITAL | Age: 63
End: 2023-09-28
Attending: FAMILY MEDICINE
Payer: COMMERCIAL

## 2023-09-28 ENCOUNTER — HOSPITAL ENCOUNTER (OUTPATIENT)
Dept: RADIOLOGY | Facility: HOSPITAL | Age: 63
Discharge: HOME | End: 2023-09-28
Attending: FAMILY MEDICINE
Payer: COMMERCIAL

## 2023-09-28 DIAGNOSIS — R10.32 LEFT LOWER QUADRANT PAIN: ICD-10-CM

## 2023-09-28 DIAGNOSIS — R19.7 DIARRHEA, UNSPECIFIED TYPE: ICD-10-CM

## 2023-09-28 PROCEDURE — 89055 LEUKOCYTE ASSESSMENT FECAL: CPT | Performed by: FAMILY MEDICINE

## 2023-09-28 PROCEDURE — 74176 CT ABD & PELVIS W/O CONTRAST: CPT | Mod: 26,,, | Performed by: RADIOLOGY

## 2023-09-28 PROCEDURE — 87045 FECES CULTURE AEROBIC BACT: CPT | Performed by: FAMILY MEDICINE

## 2023-09-28 PROCEDURE — 87449 NOS EACH ORGANISM AG IA: CPT | Mod: 91 | Performed by: FAMILY MEDICINE

## 2023-09-28 PROCEDURE — 87427 SHIGA-LIKE TOXIN AG IA: CPT | Performed by: FAMILY MEDICINE

## 2023-09-28 PROCEDURE — 25500020 PHARM REV CODE 255: Mod: PO | Performed by: FAMILY MEDICINE

## 2023-09-28 PROCEDURE — 87449 NOS EACH ORGANISM AG IA: CPT | Performed by: FAMILY MEDICINE

## 2023-09-28 PROCEDURE — A9698 NON-RAD CONTRAST MATERIALNOC: HCPCS | Mod: PO | Performed by: FAMILY MEDICINE

## 2023-09-28 PROCEDURE — 74176 CT ABD & PELVIS W/O CONTRAST: CPT | Mod: TC,PO

## 2023-09-28 PROCEDURE — 87209 SMEAR COMPLEX STAIN: CPT | Performed by: FAMILY MEDICINE

## 2023-09-28 PROCEDURE — 87329 GIARDIA AG IA: CPT | Performed by: FAMILY MEDICINE

## 2023-09-28 PROCEDURE — 87046 STOOL CULTR AEROBIC BACT EA: CPT | Performed by: FAMILY MEDICINE

## 2023-09-28 PROCEDURE — 74176 CT ABDOMEN PELVIS WITHOUT CONTRAST: ICD-10-PCS | Mod: 26,,, | Performed by: RADIOLOGY

## 2023-09-28 RX ADMIN — IOHEXOL 1000 ML: 9 SOLUTION ORAL at 04:09

## 2023-09-29 ENCOUNTER — PATIENT MESSAGE (OUTPATIENT)
Dept: FAMILY MEDICINE | Facility: CLINIC | Age: 63
End: 2023-09-29
Payer: COMMERCIAL

## 2023-09-29 ENCOUNTER — TELEPHONE (OUTPATIENT)
Dept: FAMILY MEDICINE | Facility: CLINIC | Age: 63
End: 2023-09-29
Payer: COMMERCIAL

## 2023-09-29 LAB
C DIFF GDH STL QL: NEGATIVE
C DIFF TOX A+B STL QL IA: NEGATIVE
CRYPTOSP AG STL QL IA: NEGATIVE
E COLI SXT1 STL QL IA: NEGATIVE
E COLI SXT2 STL QL IA: NEGATIVE
G LAMBLIA AG STL QL IA: NEGATIVE
WBC #/AREA STL HPF: NORMAL /[HPF]

## 2023-09-29 RX ORDER — PREDNISONE 5 MG/1
2.5 TABLET ORAL DAILY
Qty: 2 TABLET | Refills: 0 | Status: SHIPPED | OUTPATIENT
Start: 2023-09-29 | End: 2023-10-02

## 2023-09-30 LAB — BACTERIA STL CULT: NORMAL

## 2023-10-02 ENCOUNTER — TELEPHONE (OUTPATIENT)
Dept: FAMILY MEDICINE | Facility: CLINIC | Age: 63
End: 2023-10-02
Payer: COMMERCIAL

## 2023-10-02 ENCOUNTER — PATIENT MESSAGE (OUTPATIENT)
Dept: FAMILY MEDICINE | Facility: CLINIC | Age: 63
End: 2023-10-02
Payer: COMMERCIAL

## 2023-10-02 LAB — TTG IGA SER-ACNC: <0.2 U/ML

## 2023-10-02 RX ORDER — AMOXICILLIN AND CLAVULANATE POTASSIUM 875; 125 MG/1; MG/1
1 TABLET, FILM COATED ORAL EVERY 12 HOURS
Qty: 20 TABLET | Refills: 0 | Status: ON HOLD | OUTPATIENT
Start: 2023-10-02 | End: 2023-10-31 | Stop reason: HOSPADM

## 2023-10-02 NOTE — TELEPHONE ENCOUNTER
The CT scan shows some possible colitis, less likely diverticulitis.  Please see if she is still having symptoms.  If so then would recommend Augmentin 875 mg twice daily for 10 days pending her GI appointment.  If symptoms have resolved then would just wait until she sees the GI doctor.  She has an appointment scheduled with them later this week.

## 2023-10-03 LAB — O+P STL MICRO: NORMAL

## 2023-10-04 ENCOUNTER — OFFICE VISIT (OUTPATIENT)
Dept: GASTROENTEROLOGY | Facility: CLINIC | Age: 63
End: 2023-10-04
Payer: COMMERCIAL

## 2023-10-04 VITALS
WEIGHT: 160.5 LBS | HEIGHT: 64 IN | SYSTOLIC BLOOD PRESSURE: 122 MMHG | DIASTOLIC BLOOD PRESSURE: 78 MMHG | HEART RATE: 63 BPM | BODY MASS INDEX: 27.4 KG/M2

## 2023-10-04 DIAGNOSIS — R19.7 DIARRHEA, UNSPECIFIED TYPE: Primary | ICD-10-CM

## 2023-10-04 DIAGNOSIS — K90.0 CELIAC DISEASE: ICD-10-CM

## 2023-10-04 DIAGNOSIS — R93.3 ABNORMAL CT SCAN, COLON: ICD-10-CM

## 2023-10-04 DIAGNOSIS — K52.9 COLITIS: ICD-10-CM

## 2023-10-04 PROCEDURE — 99204 OFFICE O/P NEW MOD 45 MIN: CPT | Mod: S$GLB,,, | Performed by: NURSE PRACTITIONER

## 2023-10-04 PROCEDURE — 1160F PR REVIEW ALL MEDS BY PRESCRIBER/CLIN PHARMACIST DOCUMENTED: ICD-10-PCS | Mod: CPTII,S$GLB,, | Performed by: NURSE PRACTITIONER

## 2023-10-04 PROCEDURE — 3008F PR BODY MASS INDEX (BMI) DOCUMENTED: ICD-10-PCS | Mod: CPTII,S$GLB,, | Performed by: NURSE PRACTITIONER

## 2023-10-04 PROCEDURE — 3074F PR MOST RECENT SYSTOLIC BLOOD PRESSURE < 130 MM HG: ICD-10-PCS | Mod: CPTII,S$GLB,, | Performed by: NURSE PRACTITIONER

## 2023-10-04 PROCEDURE — 3008F BODY MASS INDEX DOCD: CPT | Mod: CPTII,S$GLB,, | Performed by: NURSE PRACTITIONER

## 2023-10-04 PROCEDURE — 99204 PR OFFICE/OUTPT VISIT, NEW, LEVL IV, 45-59 MIN: ICD-10-PCS | Mod: S$GLB,,, | Performed by: NURSE PRACTITIONER

## 2023-10-04 PROCEDURE — 3078F PR MOST RECENT DIASTOLIC BLOOD PRESSURE < 80 MM HG: ICD-10-PCS | Mod: CPTII,S$GLB,, | Performed by: NURSE PRACTITIONER

## 2023-10-04 PROCEDURE — 3078F DIAST BP <80 MM HG: CPT | Mod: CPTII,S$GLB,, | Performed by: NURSE PRACTITIONER

## 2023-10-04 PROCEDURE — 99999 PR PBB SHADOW E&M-EST. PATIENT-LVL V: ICD-10-PCS | Mod: PBBFAC,,, | Performed by: NURSE PRACTITIONER

## 2023-10-04 PROCEDURE — 99999 PR PBB SHADOW E&M-EST. PATIENT-LVL V: CPT | Mod: PBBFAC,,, | Performed by: NURSE PRACTITIONER

## 2023-10-04 PROCEDURE — 1160F RVW MEDS BY RX/DR IN RCRD: CPT | Mod: CPTII,S$GLB,, | Performed by: NURSE PRACTITIONER

## 2023-10-04 PROCEDURE — 1159F PR MEDICATION LIST DOCUMENTED IN MEDICAL RECORD: ICD-10-PCS | Mod: CPTII,S$GLB,, | Performed by: NURSE PRACTITIONER

## 2023-10-04 PROCEDURE — 1159F MED LIST DOCD IN RCRD: CPT | Mod: CPTII,S$GLB,, | Performed by: NURSE PRACTITIONER

## 2023-10-04 PROCEDURE — 3074F SYST BP LT 130 MM HG: CPT | Mod: CPTII,S$GLB,, | Performed by: NURSE PRACTITIONER

## 2023-10-04 RX ORDER — ACETAMINOPHEN, DIPHENHYDRAMINE HCL, PHENYLEPHRINE HCL 325; 25; 5 MG/1; MG/1; MG/1
TABLET ORAL
COMMUNITY

## 2023-10-04 RX ORDER — DIPHENHYDRAMINE HCL 12.5MG/5ML
12.5 LIQUID (ML) ORAL EVERY 4 HOURS PRN
COMMUNITY
End: 2024-03-25

## 2023-10-04 RX ORDER — SODIUM, POTASSIUM,MAG SULFATES 17.5-3.13G
SOLUTION, RECONSTITUTED, ORAL ORAL
Qty: 354 EACH | Refills: 0 | Status: ON HOLD | OUTPATIENT
Start: 2023-10-04 | End: 2023-10-31 | Stop reason: HOSPADM

## 2023-10-04 NOTE — PROGRESS NOTES
Clinic Consult:  [facility] Consultation Note    Reason for Consult:  The primary encounter diagnosis was Diarrhea, unspecified type. Diagnoses of Colitis, Celiac disease, and Abnormal CT scan, colon were also pertinent to this visit.    PCP: Pablo Ng   [address] [facility] / [place] [id number]    HPI:  This is a 63 y.o. female here for evaluation.     She has a history of Celiac disease and follows strict gluten free diet.   Over the last few months, she has had intermittent lower abdominal pain and diarrhea. This worsened 3 weeks ago. She is having about 10 watery stools per day without blood. Eating makes her symptoms worse. Consistency was watery and is now loose. Her normal bowel pattern is 2 formed stools per day.     She had workup with PCP that included labs, stool studies, and CT scan.     - celiac serology negative suggesting controlled disease.   - stool studies negative for infection especially negative for C. Diff.   - CT scan showed infectious vs inflammatory in long segment in descending and ascending colon.   She was placed on Augmentin and has completed 2 of the 10 days of treatment.     Review of Systems   Constitutional:  Negative for fever and weight loss.   HENT:  Negative for sore throat.    Respiratory:  Negative for cough, shortness of breath and wheezing.    Cardiovascular:  Negative for chest pain and palpitations.   Gastrointestinal:  Positive for abdominal pain and diarrhea. Negative for blood in stool, constipation, heartburn, melena, nausea and vomiting.   Genitourinary:  Negative for dysuria and frequency.   Skin:  Negative for itching and rash.   Neurological:  Negative for dizziness, speech change, seizures, loss of consciousness and headaches.       Medical History:  has a past medical history of Atopic dermatitis (9/27/2023), Celiac disease, DDD (degenerative disc disease), cervical, Duodenitis, Frozen shoulder syndrome, H. pylori infection, Major depressive  disorder with current active episode (2021), and Menopausal symptoms.    Surgical History:  has a past surgical history that includes  section; Tubal ligation; Esophagogastroduodenoscopy (10/1828728); Colonoscopy; Colonoscopy (N/A, 2016); and Esophagogastroduodenoscopy (Left, 3/12/2020).    Family History: family history includes Anuerysm in her father; Breast cancer in her sister; Heart failure in her father; Lung cancer in her mother; Ovarian cancer in her sister; Seizures in her paternal aunt..     Social History:  reports that she has never smoked. She has never used smokeless tobacco. She reports that she does not drink alcohol and does not use drugs.    Allergies: Reviewed    Home Medications:   Current Outpatient Medications on File Prior to Visit   Medication Sig Dispense Refill    amoxicillin-clavulanate 875-125mg (AUGMENTIN) 875-125 mg per tablet Take 1 tablet by mouth every 12 (twelve) hours. 20 tablet 0    ascorbic acid, vitamin C, (VITAMIN C) 100 MG tablet Vitamin C Take No date recorded No form recorded No frequency recorded No route recorded No set duration recorded No set duration amount recorded active No dosage strength recorded No dosage strength units of measure recorded      cetirizine (ZYRTEC) 10 MG tablet Take 10 mg by mouth once daily.      cyanocobalamin (VITAMIN B-12) 100 MCG tablet Vitamin B-12 Take No date recorded No form recorded No frequency recorded No route recorded No set duration recorded No set duration amount recorded active No dosage strength recorded No dosage strength units of measure recorded      diphenoxylate-atropine 2.5-0.025 mg (LOMOTIL) 2.5-0.025 mg per tablet Take 1 tablet by mouth 4 (four) times daily as needed for Diarrhea. 20 tablet 0    dupilumab (DUPIXENT PEN) 300 mg/2 mL PnIj Inject 300 mg into the skin every 14 (fourteen) days.      fluticasone propionate (FLONASE) 50 mcg/actuation nasal spray 2 sprays (100 mcg total) by Each Nostril route once  "daily. 18.2 mL 11    melatonin 10 mg Tab Take by mouth.      pimecrolimus (ELIDEL) 1 % cream Apply topically 2 (two) times daily. 60 g 1    triamcinolone acetonide 0.1% (KENALOG) 0.1 % cream Apply to AA on body BID x 2-3 weeks, then take a break-use as needed for flares. 454 g 2    diphenhydrAMINE (BENYLIN) 12.5 mg/5 mL liquid Take 12.5 mg by mouth every 4 (four) hours as needed for Allergies.       No current facility-administered medications on file prior to visit.       Physical Exam:  /78 (BP Location: Left arm, Patient Position: Sitting, BP Method: Medium (Manual))   Pulse 63   Ht 5' 4" (1.626 m)   Wt 72.8 kg (160 lb 7.9 oz)   BMI 27.55 kg/m²   Body mass index is 27.55 kg/m².  Physical Exam  Constitutional:       General: She is not in acute distress.  HENT:      Head: Normocephalic and atraumatic.   Cardiovascular:      Rate and Rhythm: Normal rate and regular rhythm.      Heart sounds: Normal heart sounds. No murmur heard.  Pulmonary:      Effort: Pulmonary effort is normal. No respiratory distress.      Breath sounds: Normal breath sounds. No wheezing.   Abdominal:      General: Abdomen is flat. Bowel sounds are increased.      Palpations: Abdomen is soft.      Tenderness: There is no abdominal tenderness.   Neurological:      Mental Status: She is alert.   Psychiatric:         Mood and Affect: Mood normal.         Behavior: Behavior normal.         Judgment: Judgment normal.         Labs: Pertinent labs reviewed.  CRC Screening: up to date     Assessment:  1. Diarrhea, unspecified type    2. Colitis    3. Celiac disease    4. Abnormal CT scan, colon      Recommendations:   - continue Augmentin but needs to start probiotics   - do not take imodium or lomotil   - needs colonoscopy   - A referral has been placed for endoscopy procedure scheduling and pre-admission testing (PAT) appointment has been scheduled.      Diarrhea, unspecified type  -     Ambulatory referral/consult to Gastroenterology  -    "  Case Request Endoscopy: COLONOSCOPY  -     Ambulatory referral/consult to Endo Procedure ; Future; Expected date: 10/05/2023  -     sodium,potassium,mag sulfates (SUPREP BOWEL PREP KIT) 17.5-3.13-1.6 gram SolR; Use as directed  Dispense: 354 each; Refill: 0    Colitis  -     Case Request Endoscopy: COLONOSCOPY  -     Ambulatory referral/consult to Endo Procedure ; Future; Expected date: 10/05/2023  -     sodium,potassium,mag sulfates (SUPREP BOWEL PREP KIT) 17.5-3.13-1.6 gram SolR; Use as directed  Dispense: 354 each; Refill: 0    Celiac disease  -     Ambulatory referral/consult to Gastroenterology    Abnormal CT scan, colon    Follow up to be determined after results/ procedure(s).    Thank you so much for allowing me to participate in the care of CALLY Jerez

## 2023-10-05 ENCOUNTER — HOSPITAL ENCOUNTER (OUTPATIENT)
Dept: PREADMISSION TESTING | Facility: HOSPITAL | Age: 63
Discharge: HOME | End: 2023-10-05
Attending: INTERNAL MEDICINE
Payer: COMMERCIAL

## 2023-10-05 DIAGNOSIS — K52.9 COLITIS: ICD-10-CM

## 2023-10-05 DIAGNOSIS — R19.7 DIARRHEA, UNSPECIFIED TYPE: ICD-10-CM

## 2023-10-31 ENCOUNTER — HOSPITAL ENCOUNTER (OUTPATIENT)
Facility: HOSPITAL | Age: 63
Discharge: HOME | End: 2023-10-31
Attending: INTERNAL MEDICINE | Admitting: INTERNAL MEDICINE
Payer: COMMERCIAL

## 2023-10-31 ENCOUNTER — ANESTHESIA (OUTPATIENT)
Dept: ENDOSCOPY | Facility: HOSPITAL | Age: 63
End: 2023-10-31
Payer: COMMERCIAL

## 2023-10-31 ENCOUNTER — ANESTHESIA EVENT (OUTPATIENT)
Dept: ENDOSCOPY | Facility: HOSPITAL | Age: 63
End: 2023-10-31
Payer: COMMERCIAL

## 2023-10-31 DIAGNOSIS — R19.7 DIARRHEA IN ADULT PATIENT: ICD-10-CM

## 2023-10-31 PROCEDURE — 63600175 PHARM REV CODE 636 W HCPCS: Performed by: NURSE ANESTHETIST, CERTIFIED REGISTERED

## 2023-10-31 PROCEDURE — 37000008 HC ANESTHESIA 1ST 15 MINUTES: Performed by: INTERNAL MEDICINE

## 2023-10-31 PROCEDURE — 45380 COLONOSCOPY AND BIOPSY: CPT | Performed by: INTERNAL MEDICINE

## 2023-10-31 PROCEDURE — 88305 TISSUE EXAM BY PATHOLOGIST: ICD-10-PCS | Mod: 26,,, | Performed by: PATHOLOGY

## 2023-10-31 PROCEDURE — 45380 COLONOSCOPY AND BIOPSY: CPT | Mod: ,,, | Performed by: INTERNAL MEDICINE

## 2023-10-31 PROCEDURE — 37000009 HC ANESTHESIA EA ADD 15 MINS: Performed by: INTERNAL MEDICINE

## 2023-10-31 PROCEDURE — 88305 TISSUE EXAM BY PATHOLOGIST: CPT | Mod: 26,,, | Performed by: PATHOLOGY

## 2023-10-31 PROCEDURE — 45380 PR COLONOSCOPY,BIOPSY: ICD-10-PCS | Mod: ,,, | Performed by: INTERNAL MEDICINE

## 2023-10-31 PROCEDURE — 25000003 PHARM REV CODE 250: Performed by: NURSE ANESTHETIST, CERTIFIED REGISTERED

## 2023-10-31 PROCEDURE — 27201012 HC FORCEPS, HOT/COLD, DISP: Performed by: INTERNAL MEDICINE

## 2023-10-31 PROCEDURE — 88305 TISSUE EXAM BY PATHOLOGIST: CPT | Performed by: PATHOLOGY

## 2023-10-31 RX ORDER — LIDOCAINE HYDROCHLORIDE 10 MG/ML
INJECTION, SOLUTION EPIDURAL; INFILTRATION; INTRACAUDAL; PERINEURAL
Status: DISCONTINUED | OUTPATIENT
Start: 2023-10-31 | End: 2023-10-31

## 2023-10-31 RX ORDER — SODIUM CHLORIDE, SODIUM LACTATE, POTASSIUM CHLORIDE, CALCIUM CHLORIDE 600; 310; 30; 20 MG/100ML; MG/100ML; MG/100ML; MG/100ML
INJECTION, SOLUTION INTRAVENOUS CONTINUOUS
Status: DISCONTINUED | OUTPATIENT
Start: 2023-10-31 | End: 2023-10-31 | Stop reason: HOSPADM

## 2023-10-31 RX ORDER — PROPOFOL 10 MG/ML
VIAL (ML) INTRAVENOUS
Status: DISCONTINUED | OUTPATIENT
Start: 2023-10-31 | End: 2023-10-31

## 2023-10-31 RX ADMIN — PROPOFOL 50 MG: 10 INJECTION, EMULSION INTRAVENOUS at 11:10

## 2023-10-31 RX ADMIN — SODIUM CHLORIDE, POTASSIUM CHLORIDE, SODIUM LACTATE AND CALCIUM CHLORIDE: 600; 310; 30; 20 INJECTION, SOLUTION INTRAVENOUS at 11:10

## 2023-10-31 RX ADMIN — PROPOFOL 100 MG: 10 INJECTION, EMULSION INTRAVENOUS at 11:10

## 2023-10-31 RX ADMIN — PROPOFOL 30 MG: 10 INJECTION, EMULSION INTRAVENOUS at 11:10

## 2023-10-31 RX ADMIN — LIDOCAINE HYDROCHLORIDE 50 MG: 10 SOLUTION INTRAVENOUS at 11:10

## 2023-10-31 NOTE — TRANSFER OF CARE
"Anesthesia Transfer of Care Note    Patient: Meg Goff    Procedure(s) Performed: Procedure(s) (LRB):  COLONOSCOPY (N/A)    Patient location: GI    Anesthesia Type: MAC    Transport from OR: Transported from OR on room air with adequate spontaneous ventilation    Post pain: adequate analgesia    Post assessment: no apparent anesthetic complications    Post vital signs: stable    Level of consciousness: responds to stimulation    Nausea/Vomiting: no nausea/vomiting    Complications: none    Transfer of care protocol was followed      Last vitals:   Visit Vitals  /63   Pulse (!) 58   Temp 37.1 °C (98.8 °F) (Temporal)   Resp 20   Ht 5' 4" (1.626 m)   Wt 68.5 kg (151 lb)   SpO2 99%   Breastfeeding No   BMI 25.92 kg/m²     "

## 2023-10-31 NOTE — ANESTHESIA POSTPROCEDURE EVALUATION
Anesthesia Post Evaluation    Patient: Meg Goff    Procedure(s) Performed: Procedure(s) (LRB):  COLONOSCOPY (N/A)    Final Anesthesia Type: MAC      Patient location during evaluation: GI PACU  Patient participation: Yes- Able to Participate  Level of consciousness: awake and alert  Post-procedure vital signs: reviewed and stable  Pain management: adequate  Airway patency: patent    PONV status at discharge: No PONV  Anesthetic complications: no      Cardiovascular status: hemodynamically stable  Respiratory status: unassisted, room air and spontaneous ventilation  Hydration status: euvolemic  Follow-up not needed.          Vitals Value Taken Time   /78 10/31/23 1211   Temp 36.7 °C (98 °F) 10/31/23 1156   Pulse 62 10/31/23 1211   Resp 18 10/31/23 1211   SpO2 100 % 10/31/23 1211         No case tracking events are documented in the log.      Pain/Kandy Score: Kandy Score: 10 (10/31/2023 12:11 PM)

## 2023-10-31 NOTE — ANESTHESIA PREPROCEDURE EVALUATION
10/31/2023  Meg Goff is a 63 y.o., female.  She has a history of Celiac disease and follows strict gluten free diet.   Over the last few months, she has had intermittent lower abdominal pain and diarrhea. This worsened 3 weeks ago. She is having about 10 watery stools per day without blood. Eating makes her symptoms worse. Consistency was watery and is now loose. Her normal bowel pattern is 2 formed stools per day.      She had workup with PCP that included labs, stool studies, and CT scan.      - celiac serology negative suggesting controlled disease.   - stool studies negative for infection especially negative for C. Diff.   - CT scan showed infectious vs inflammatory in long segment in descending and ascending colon.   She was placed on Augmentin and has completed 2 of the 10 days of treatment.     Pre-op Assessment    I have reviewed the Patient Summary Reports.     I have reviewed the Nursing Notes. I have reviewed the NPO Status.   I have reviewed the Medications.     Review of Systems  Anesthesia Hx:  No problems with previous Anesthesia    Social:  Non-Smoker, No Alcohol Use    Cardiovascular:  Cardiovascular Normal Exercise tolerance: good     Pulmonary:  Pulmonary Normal    Renal/:  Renal/ Normal     Hepatic/GI:   Bowel Prep. celiac disease    Musculoskeletal:   Arthritis   Spine Disorders: Disc disease and Degenerative disease    Neurological:  Neurology Normal    Endocrine:  Endocrine Normal    Psych:   Psychiatric History depression          Physical Exam  General: Well nourished and Cooperative    Airway:  Mallampati: II   Mouth Opening: Normal  TM Distance: Normal  Tongue: Normal  Neck ROM: Normal ROM    Dental:  Intact        Anesthesia Plan  Type of Anesthesia, risks & benefits discussed:    Anesthesia Type: Gen Natural Airway, MAC  Intra-op Monitoring Plan: Standard ASA  Monitors  Induction:  IV  Informed Consent: Informed consent signed with the Patient and all parties understand the risks and agree with anesthesia plan.  All questions answered.   ASA Score: 2  Day of Surgery Review of History & Physical: H&P Update referred to the surgeon/provider.I have interviewed and examined the patient. I have reviewed the patient's H&P dated: There are no significant changes.     Ready For Surgery From Anesthesia Perspective.     .

## 2023-10-31 NOTE — H&P
PRE PROCEDURE H&P    Patient Name: Meg Goff  MRN: [id number]  : 1960  Date of Procedure:  10/31/2023  Referring Physician: Swetha Bright NP  Primary Physician: Pablo Ng MD  Procedure Physician: Maureen Santacruz MD       Planned Procedure: Colonoscopy  Diagnosis: chronic diarrhea of 2 duration      Chief Complaint: Same as above    HPI: Patient is an 63 y.o. female is here for the above. Hx of celiac disease. Diarrhea began in 2023 despite strict gluten free diet. Previous colonoscopy . Colon biopsies negative. Stool studies ordered by PCP also negative. Diarrhea occurs soon after eating. Non-bloody. Endorses weight loss.      Last colonoscopy:   Family history: none  Anticoagulation: none    Past Medical History:   Past Medical History:   Diagnosis Date    Atopic dermatitis 2023    Celiac disease     DDD (degenerative disc disease), cervical     Duodenitis     Frozen shoulder syndrome     H. pylori infection     Major depressive disorder with current active episode 2021    Menopausal symptoms         Past Surgical History:  Past Surgical History:   Procedure Laterality Date     SECTION      COLONOSCOPY      COLONOSCOPY N/A 2016    Procedure: COLONOSCOPY;  Surgeon: Roberta Boateng MD;  Location: [facility];  Service: Endoscopy;  Laterality: N/A;    ESOPHAGOGASTRODUODENOSCOPY  [id number]    ESOPHAGOGASTRODUODENOSCOPY Left 3/12/2020    Procedure: ESOPHAGOGASTRODUODENOSCOPY (EGD);  Surgeon: Maureen Santacruz MD;  Location: [facility];  Service: Endoscopy;  Laterality: Left;    TUBAL LIGATION          Home Medications:  Prior to Admission medications    Medication Sig Start Date End Date Taking? Authorizing Provider   ascorbic acid, vitamin C, (VITAMIN C) 100 MG tablet Vitamin C Take No date recorded No form recorded No frequency recorded No route recorded No set duration recorded No set duration amount recorded active No dosage strength recorded No  dosage strength units of measure recorded   Yes Provider, Historical   cyanocobalamin (VITAMIN B-12) 100 MCG tablet Vitamin B-12 Take No date recorded No form recorded No frequency recorded No route recorded No set duration recorded No set duration amount recorded active No dosage strength recorded No dosage strength units of measure recorded   Yes Provider, Historical   dupilumab (DUPIXENT PEN) 300 mg/2 mL PnIj Inject 300 mg into the skin every 14 (fourteen) days.   Yes Provider, Historical   amoxicillin-clavulanate 875-125mg (AUGMENTIN) 875-125 mg per tablet Take 1 tablet by mouth every 12 (twelve) hours. 10/2/23   Pablo Ng MD   cetirizine (ZYRTEC) 10 MG tablet Take 10 mg by mouth once daily.    Provider, Historical   diphenhydrAMINE (BENYLIN) 12.5 mg/5 mL liquid Take 12.5 mg by mouth every 4 (four) hours as needed for Allergies.    Provider, Historical   fluticasone propionate (FLONASE) 50 mcg/actuation nasal spray 2 sprays (100 mcg total) by Each Nostril route once daily. 2/10/23   Halie Magaña DNP   melatonin 10 mg Tab Take by mouth.    Provider, Historical   pimecrolimus (ELIDEL) 1 % cream Apply topically 2 (two) times daily. 8/23/23   Naomi Mcguire MD   sodium,potassium,mag sulfates (SUPREP BOWEL PREP KIT) 17.5-3.13-1.6 gram SolR Use as directed 10/4/23   Swetha Bright NP   triamcinolone acetonide 0.1% (KENALOG) 0.1 % cream Apply to AA on body BID x 2-3 weeks, then take a break-use as needed for flares. 5/16/23   Naomi Mcguire MD        Allergies:  Review of patient's allergies indicates:   Allergen Reactions    Gluten protein     Sulfa (sulfonamide antibiotics)     Sulfur         Social History:   Social History     Socioeconomic History    Marital status:    Tobacco Use    Smoking status: Never    Smokeless tobacco: Never   Substance and Sexual Activity    Alcohol use: No    Drug use: No       Family History:  Family History   Problem Relation Age of Onset    Lung  "cancer Mother     Heart failure Father     Anuerysm Father         abd    Seizures Paternal Aunt     Breast cancer Sister     Ovarian cancer Sister     Allergic rhinitis Neg Hx     Asthma Neg Hx     Atopy Neg Hx     Eczema Neg Hx     Immunodeficiency Neg Hx     Rhinitis Neg Hx     Urticaria Neg Hx     Angioedema Neg Hx     Allergies Neg Hx        ROS: No acute cardiac events, no acute respiratory complaints.     Physical Exam (all patients):    /63   Pulse (!) 58   Temp 98.8 °F (37.1 °C) (Temporal)   Resp 20   Ht 5' 4" (1.626 m)   Wt 68.5 kg (151 lb)   SpO2 99%   Breastfeeding No   BMI 25.92 kg/m²   Lungs: Clear to auscultation bilaterally, respirations unlabored  Heart: Regular rate and rhythm, S1 and S2 normal, no obvious murmurs  Abdomen:         Soft, non-tender, bowel sounds normal, no masses, no organomegaly    Lab Results   Component Value Date    WBC 6.62 09/08/2022    MCV 92 09/08/2022    RDW 14.4 09/08/2022     09/08/2022    INR 1.0 03/02/2020    GLU 91 09/08/2022    BUN 16 09/08/2022     09/08/2022    K 4.7 09/08/2022     09/08/2022        SEDATION PLAN: per anesthesia      History reviewed, vital signs satisfactory, cardiopulmonary status satisfactory, sedation options, risks and plans have been discussed with the patient  All their questions were answered and the patient agrees to the sedation procedures as planned and the patient is deemed an appropriate candidate for the sedation as planned.    The risks, benefits and alternatives of the procedure were discussed with the patient in detail. This discussion was had in the presence of her partner and endoscopy staff. The risks include, risks of adverse reaction to sedation requiring the use of reversal agents, bleeding requiring blood transfusion, perforation requiring surgical intervention and technical failure. Other risks include aspiration leading to respiratory distress and respiratory failure resulting in " endotracheal intubation and mechanical ventilation including death. If anesthesia is being utilized for this procedure, it is up to the anesthesiologist to determine airway safety including elective endotracheal intubation. Questions were answered, they agree to proceed. There was no language barriers.      Procedure explained to patient, informed consent obtained and placed in chart.    Maureen Santacruz  10/31/2023  11:23 AM

## 2023-10-31 NOTE — PROVATION PATIENT INSTRUCTIONS
Discharge Summary/Instructions after an Endoscopic Procedure  Patient Name: Meg Goff  Patient MRN: [id number]  Patient YOB: 1960 Tuesday, October 31, 2023 Maureen Santacruz MD  Dear patient,  As a result of recent federal legislation (The Federal Cures Act), you may   receive lab or pathology results from your procedure in your [facility]   account before your physician is able to contact you. Your physician or   their representative will relay the results to you with their   recommendations at their soonest availability.  Thank you,  RESTRICTIONS:  During your procedure today, you received medications for sedation.  These   medications may affect your judgment, balance and coordination.  Therefore,   for 24 hours, you have the following restrictions:   - DO NOT drive a car, operate machinery, make legal/financial decisions,   sign important papers or drink alcohol.    ACTIVITY:  Today: no heavy lifting, straining or running due to procedural   sedation/anesthesia.  The following day: return to full activity including work.  DIET:  Eat and drink normally unless instructed otherwise.     TREATMENT FOR COMMON SIDE EFFECTS:  - Mild abdominal pain, nausea, belching, bloating or excessive gas:  rest,   eat lightly and use a heating pad.  - Sore Throat: treat with throat lozenges and/or gargle with warm salt   water.  - Because air was used during the procedure, expelling large amounts of air   from your rectum or belching is normal.  - If a bowel prep was taken, you may not have a bowel movement for 1-3 days.    This is normal.  SYMPTOMS TO WATCH FOR AND REPORT TO YOUR PHYSICIAN:  1. Abdominal pain or bloating, other than gas cramps.  2. Chest pain.  3. Back pain.  4. Signs of infection such as: chills or fever occurring within 24 hours   after the procedure.  5. Rectal bleeding, which would show as bright red, maroon, or black stools.   (A tablespoon of blood from the rectum is not serious, especially  if   hemorrhoids are present.)  6. Vomiting.  7. Weakness or dizziness.  GO DIRECTLY TO THE NEAREST EMERGENCY ROOM IF YOU HAVE ANY OF THE FOLLOWING:      Difficulty breathing              Chills and/or fever over 101 F   Persistent vomiting and/or vomiting blood   Severe abdominal pain   Severe chest pain   Black, tarry stools   Bleeding- more than one tablespoon   Any other symptom or condition that you feel may need urgent attention  Your doctor recommends these additional instructions:  If any biopsies were taken, your doctors clinic will contact you in 1 to 2   weeks with any results.  - Discharge patient to home (with escort).   - Resume previous diet.   - Continue present medications.   - Perform a small bowel enteroscopy (SBE). You will be contacted to schedule   this study by our GI department.   - Await pathology results.   - Avoid caffeine products including coffee, tea and energy drinks.  - Repeat colonoscopy in 10 years for screening purposes.   - Return to nurse practitioner with BRE Sargent.  For questions, problems or results please call your physician Maureen Santacruz MD at Work:  [phone]  If you have any questions about the above instructions, call the GI   department at [phone] or call the endoscopy unit at [phone]   from 7am until 3 pm.  [facility], EMERGENCY ROOM PHONE NUMBER:   [phone]  IF A COMPLICATION OR EMERGENCY SITUATION ARISES AND YOU ARE UNABLE TO REACH   YOUR PHYSICIAN - GO DIRECTLY TO THE EMERGENCY ROOM.  I have read or have had read to me these discharge instructions for my   procedure and have received a written copy.  I understand these   instructions and will follow-up with my physician if I have any questions.     __________________________________       _____________________________________  Nurse Signature                                          Patient/Designated   Responsible Party Signature  Maureen Santacruz  MD Maureen Santacruz MD  10/31/2023 12:01:03 PM  This report has been verified and signed electronically.  Dear patient,  As a result of recent federal legislation (The Federal Cures Act), you may   receive lab or pathology results from your procedure in your [facility]   account before your physician is able to contact you. Your physician or   their representative will relay the results to you with their   recommendations at their soonest availability.  Thank you,  PROVATION

## 2023-11-01 VITALS
HEIGHT: 64 IN | SYSTOLIC BLOOD PRESSURE: 128 MMHG | HEART RATE: 62 BPM | BODY MASS INDEX: 25.78 KG/M2 | RESPIRATION RATE: 18 BRPM | OXYGEN SATURATION: 100 % | TEMPERATURE: 98 F | DIASTOLIC BLOOD PRESSURE: 78 MMHG | WEIGHT: 151 LBS

## 2023-11-02 LAB
FINAL PATHOLOGIC DIAGNOSIS: NORMAL
GROSS: NORMAL
Lab: NORMAL

## 2023-11-06 ENCOUNTER — PATIENT MESSAGE (OUTPATIENT)
Dept: GASTROENTEROLOGY | Facility: CLINIC | Age: 63
End: 2023-11-06
Payer: COMMERCIAL

## 2023-11-09 NOTE — TELEPHONE ENCOUNTER
"Consults    Reason For Consult  GOC    HPI  Sonal Cramer is a 79 y.o. female presenting with past medical history  CVA 3 weeks ago, Atrial fibrillation on eliquis, HTN, HLD, PAD, blind in R eye presents with fall sp R hip fx. Patient is poor historian 2/2 what seems to be dementia, and speech was effected by recent stroke. For these reasons the history was obtained from daughter and record review, Berta. Berta reports that the pt has been declining for at least 1 year. She noted that the pt had some cogitative issues, was losing weight 20-25 lbs over this last year, falling intermittently and mobility has declined over the past couple mos to being \"very slow with a walker.\" The pt had a stroke about 3 weeks ago and she was sent to [facility] to receive therapy post CVA. Pt was reported to be overmedicated relatively shortly after being there for night time issues w sleep. The family did not like that. She also fell twice, days apart there, and one of which may have been a slide out of her chair as opposed to a true fall. On Friday November 3rd, the pt was found sitting  on her bedroom floor  by nurses and pt had not idea what happened or how. The floor was dry, no clutter, and she didn't have socks on per the record. She denied pain immediately following the incident. She started complaining of right hip pain a little while later. An x-ray was obtained at the facility and the fracture of the lesser trochanter was identified. EMS transferred to the [facility] ED that night and her hip was surgically repaired. Pt recovered from the surgery as expected, however, there is concern raised by the medical team about pt condition going forward as it pertains to being elderly with a broken hip and her ability to truly maintain her health. Palliative care consulted to determine GOC.     Medical Decision Making/Goals of Care/Advance Care Planning:  Patient's current clinical condition, including diagnosis, prognosis, and " Spoke w/ pt , resched nurse visit to 1/5/23     "management plan, and goals of care were discussed.   Life limiting disease: CVA, Vascular Dementia w dysphagia + hip fracture   Family: Supportive   Performance status: consistent with  a KPS of 30  Joys/meaning/strength: Family   Understanding of health:  Daughter did not have a good understanding, but was wondering about dementia and what to expect. She has never been told her mother has this condition or what to expect.   Information:Wants full disclosure. She does prefer to discuss concerns and issues with her family without palliative support at this time.   Medical update:Discussed that pt is generally stable at the moment, but does still require supportive care to maintain that at this time. Education provided on dementia complicated by CVA, and broken hip. Explained what to expect going orward with continued decline and some of the challenges such as behaviors, eating, immobility, and that the pt will succumb to this condition naturally at some point likely over the next months.    Prognosis:  Discussed as poor and provided reasoning for this given pt condition and hx  Goals:  Daughter is hoping that pt could get to a better care facility, get rehab, and return home with family.   Worries and fears now and future: Daughter is worried that the rest of the family doesn't know what's going on and what to expect.   [place]: Unsure  Minimum acceptable outcome/QOL:  unsure  Code status and life support:  Daughter expressed that pt has always wanted \"everything done to keep her alive.\" Daughter feels that it would be reasonable to put in a feeding tube if pt cannot eat or drink. Did discuss further and inform daughter of recommendations for considering DNR and also pleasure feeding as opposed to a feeding tube.Code status full for this reason. Daughter wanted to  discuss further with all of her siblings present at family meeting she requested on 11/10.     Advance Directives  Eddy Hampton is the POA on " record, however no documents on file.   Pt does not have a living will  Pentecostalism and Importance of Pentecostalism:  Yazdanism- Important    ____________________________________________________________      Past Medical History  She has a past medical history of Adult onset vitelliform macular dystrophy (2017), Anesthesia of skin, Arthritis, Essential (primary) hypertension (01/08/2018), Eye trauma, Paresthesia of skin, Personal history of other diseases of the circulatory system, Personal history of other diseases of the circulatory system, Personal history of other diseases of the musculoskeletal system and connective tissue (04/26/2013), Personal history of other endocrine, nutritional and metabolic disease (04/26/2013), Personal history of other specified conditions, and Stroke (CMS/[facility]).    Surgical History  She has a past surgical history that includes Other surgical history (03/22/2013); Tubal ligation (03/22/2013); Colonoscopy (12/05/2017); Colonoscopy (02/12/2014); CT angio aorta and bilateral iliofemoral runoff w and or wo IV contrast (08/16/2017); and Colonoscopy (04/2022).     Social History  She reports that she has been smoking cigarettes. She has a 25.00 pack-year smoking history. She has never used smokeless tobacco. She reports that she does not currently use alcohol. She reports that she does not use drugs. Lives with with daughter Alona Cramer    Family History  Family History   Problem Relation Name Age of Onset    Alcohol abuse Mother      Cirrhosis Mother      Other (chronic kidney disease) Sister          NKF classification    Diabetes Maternal Grandmother          Allergies  Patient has no known allergies.    Review of Systems  No other ROS findings except what is noted in HPI     Physical Exam  Constitutional:       General: She is not in acute distress. Frail elderly woman     Appearance: Normal appearance. .   HENT:      Head: Normocephalic and atraumatic.      Nose: Nose normal. No congestion or  rhinorrhea.      Mouth/Throat:      Mouth: Mucous membranes are dry.   Eyes:      General: No scleral icterus.     Extraocular Movements: Extraocular movements intact.      Conjunctiva/sclera: Conjunctivae normal.      Pupils: Pupils are equal, round, and reactive to light.      Comments: R anophthalmia   Cardiovascular:      Rate and Rhythm: Normal rate and regular rhythm.      Heart sounds: Normal heart sounds. No murmur heard.     No friction rub. No gallop.   Pulmonary:      Effort: Pulmonary effort is normal. No respiratory distress.      Breath sounds: Normal breath sounds. No wheezing, rhonchi or rales.   Musculoskeletal:      Comments: R hip bandaged   Skin:     General: Skin is warm and dry.      Coloration: Skin is not jaundiced.      Findings: No erythema or lesion.   Neurological:      Mental Status: She is alert.      Comments: dysarthria      Last Recorded Vitals  /63   Pulse 69   Temp 36.1 °C (97 °F)   Resp 18   Wt 54 kg (119 lb 0.8 oz)   SpO2 96%     Relevant Results  Results from last 7 days   Lab Units 11/09/23  0528 11/08/23  0504 11/07/23  0518 11/05/23  0522 11/03/23  2340   SODIUM mmol/L 138 135* 139   < > 138   POTASSIUM mmol/L 3.6 4.0 3.8   < > 3.7   CHLORIDE mmol/L 106 104 105   < > 102   CO2 mmol/L 23 21 25   < > 26   BUN mg/dL 13 11 11   < > 25*   CREATININE mg/dL 0.71 0.68 0.67   < > 1.06*   CALCIUM mg/dL 8.0* 8.1* 8.0*   < > 9.5   PROTEIN TOTAL g/dL 5.3*  --   --   --  7.1   BILIRUBIN TOTAL mg/dL 0.9  --   --   --  0.5   ALK PHOS U/L 52  --   --   --  68   ALT U/L 7  --   --   --  18   AST U/L 16  --   --   --  22   GLUCOSE mg/dL 103* 87 103*   < > 136*    < > = values in this interval not displayed.         Results from last 7 days   Lab Units 11/09/23  1555 11/09/23  0528 11/08/23  0647   WBC AUTO x10*3/uL 12.0* 11.6* 10.9   HEMOGLOBIN g/dL 7.9* 8.0* 8.4*   HEMATOCRIT % 25.2* 24.7* 25.7*   PLATELETS AUTO x10*3/uL 305 277 225     Imaging  All imaging reviewed from the past 2  hospitalizations.   Did review findings on the CT scans with daughter that support vascular dementia along w the hx she has provided    Scheduled medications  amitriptyline, 25 mg, oral, Nightly  amLODIPine, 10 mg, oral, Daily  apixaban, 2.5 mg, oral, BID  aspirin, 81 mg, oral, Daily  atenolol, 25 mg, oral, Daily  atorvastatin, 10 mg, oral, Daily  cyanocobalamin, 250 mcg, oral, Daily  ergocalciferol, 1,250 mcg, oral, Weekly  lidocaine, 1 patch, transdermal, Daily  magnesium sulfate, 2 g, intravenous, Once  mirtazapine, 15 mg, oral, Nightly  polyethylene glycol, 17 g, oral, Daily  sennosides-docusate sodium, 2 tablet, oral, BID  thiamine, 100 mg, oral, Daily  zinc oxide, 1 Application, Topical, TID      Continuous medications     PRN medications  PRN medications: acetaminophen, oxyCODONE, oxyCODONE     Hospice Eligibility:  Is the patient hospice-eligible? Yes- 2/2 to vascular dementia and CVA w dysphagia   Counseling provided re hospice services? Deferred until meeting w family on 11/10  Family Impression: Deferred to family meeting on 11/10    ASSESSMENT/PLAN  Sonal Cramer is a 79 y.o. female presenting with past medical history  CVA 3 weeks ago w dysphagia and dysphasia, Vascular dementia ( newly identified, though there based on hx for at least a year), Atrial fibrillation on eliquis, HTN, HLD, PAD, blind in R eye presents with fall sp R hip fx.    GOC: Undetermined  Prognosis: Poor, months likely < 6  Continue supportive care    Berta ( Daughter and POA) wants to have information provided to her today to be reviewed with her  and her siblings when they are all able to be together. Family meeting to discuss GOC w the palliative team is set for 11am on 11/10 via conference call.    Updated medical team    I spent 60  minutes in the care of this patient which included chart review, counseling, discussion with primary team, bedside RN, and coordination of care.    Yasmeen Man, APRN-CNP

## 2023-11-13 ENCOUNTER — HOSPITAL ENCOUNTER (OUTPATIENT)
Dept: PREADMISSION TESTING | Facility: HOSPITAL | Age: 63
Discharge: HOME | End: 2023-11-13
Attending: COLON & RECTAL SURGERY
Payer: COMMERCIAL

## 2023-11-16 ENCOUNTER — PATIENT MESSAGE (OUTPATIENT)
Dept: GASTROENTEROLOGY | Facility: CLINIC | Age: 63
End: 2023-11-16
Payer: COMMERCIAL

## 2023-11-16 NOTE — PROGRESS NOTES
"Your colon biopsies show an increased number of white blood cells that may be contributing to your diarrhea. Often this is seen with certain medicines. I reviewed your medication list. Dupilumab or "DUPIXENT" used for your atopic dermatitis may be contributing to your loose stools. I recommend you stop this medication for 1 month and follow up with Ms. Swetha Bright in the GI office. If your diarrhea persists despite stopping the medication for a month then you may have a condition called lymphocytic colitis. Please follow up in clinic."

## 2023-11-21 ENCOUNTER — ANESTHESIA (OUTPATIENT)
Dept: ENDOSCOPY | Facility: HOSPITAL | Age: 63
End: 2023-11-21
Payer: COMMERCIAL

## 2023-11-21 ENCOUNTER — ANESTHESIA EVENT (OUTPATIENT)
Dept: ENDOSCOPY | Facility: HOSPITAL | Age: 63
End: 2023-11-21
Payer: COMMERCIAL

## 2023-11-21 ENCOUNTER — HOSPITAL ENCOUNTER (OUTPATIENT)
Facility: HOSPITAL | Age: 63
Discharge: HOME | End: 2023-11-21
Attending: INTERNAL MEDICINE | Admitting: INTERNAL MEDICINE
Payer: COMMERCIAL

## 2023-11-21 DIAGNOSIS — R19.7 DIARRHEA IN ADULT PATIENT: ICD-10-CM

## 2023-11-21 PROCEDURE — 88305 TISSUE EXAM BY PATHOLOGIST: CPT | Performed by: PATHOLOGY

## 2023-11-21 PROCEDURE — 44361 SMALL BOWEL ENDOSCOPY/BIOPSY: CPT | Performed by: INTERNAL MEDICINE

## 2023-11-21 PROCEDURE — 44361 SMALL BOWEL ENDOSCOPY/BIOPSY: CPT | Mod: ,,, | Performed by: INTERNAL MEDICINE

## 2023-11-21 PROCEDURE — 88305 TISSUE EXAM BY PATHOLOGIST: CPT | Mod: 26,,, | Performed by: PATHOLOGY

## 2023-11-21 PROCEDURE — 37000009 HC ANESTHESIA EA ADD 15 MINS: Performed by: INTERNAL MEDICINE

## 2023-11-21 PROCEDURE — 27201012 HC FORCEPS, HOT/COLD, DISP: Performed by: INTERNAL MEDICINE

## 2023-11-21 PROCEDURE — 37000008 HC ANESTHESIA 1ST 15 MINUTES: Performed by: INTERNAL MEDICINE

## 2023-11-21 PROCEDURE — 44361 PR SMALL BOWEL ENDOSCOPY,BIOPSY: ICD-10-PCS | Mod: ,,, | Performed by: INTERNAL MEDICINE

## 2023-11-21 PROCEDURE — 63600175 PHARM REV CODE 636 W HCPCS: Performed by: NURSE ANESTHETIST, CERTIFIED REGISTERED

## 2023-11-21 PROCEDURE — 88305 TISSUE EXAM BY PATHOLOGIST: ICD-10-PCS | Mod: 26,,, | Performed by: PATHOLOGY

## 2023-11-21 PROCEDURE — 25000003 PHARM REV CODE 250: Performed by: NURSE ANESTHETIST, CERTIFIED REGISTERED

## 2023-11-21 RX ORDER — SODIUM CHLORIDE, SODIUM LACTATE, POTASSIUM CHLORIDE, CALCIUM CHLORIDE 600; 310; 30; 20 MG/100ML; MG/100ML; MG/100ML; MG/100ML
INJECTION, SOLUTION INTRAVENOUS CONTINUOUS PRN
Status: DISCONTINUED | OUTPATIENT
Start: 2023-11-21 | End: 2023-11-21

## 2023-11-21 RX ORDER — LIDOCAINE HYDROCHLORIDE 10 MG/ML
INJECTION, SOLUTION EPIDURAL; INFILTRATION; INTRACAUDAL; PERINEURAL
Status: DISCONTINUED | OUTPATIENT
Start: 2023-11-21 | End: 2023-11-21

## 2023-11-21 RX ORDER — SODIUM CHLORIDE, SODIUM LACTATE, POTASSIUM CHLORIDE, CALCIUM CHLORIDE 600; 310; 30; 20 MG/100ML; MG/100ML; MG/100ML; MG/100ML
INJECTION, SOLUTION INTRAVENOUS CONTINUOUS
Status: DISCONTINUED | OUTPATIENT
Start: 2023-11-21 | End: 2023-11-21 | Stop reason: HOSPADM

## 2023-11-21 RX ORDER — PROPOFOL 10 MG/ML
VIAL (ML) INTRAVENOUS
Status: DISCONTINUED | OUTPATIENT
Start: 2023-11-21 | End: 2023-11-21

## 2023-11-21 RX ADMIN — SODIUM CHLORIDE, SODIUM LACTATE, POTASSIUM CHLORIDE, AND CALCIUM CHLORIDE: 600; 310; 30; 20 INJECTION, SOLUTION INTRAVENOUS at 12:11

## 2023-11-21 RX ADMIN — Medication 40 MG: at 12:11

## 2023-11-21 RX ADMIN — Medication 30 MG: at 12:11

## 2023-11-21 RX ADMIN — Medication 70 MG: at 12:11

## 2023-11-21 RX ADMIN — LIDOCAINE HYDROCHLORIDE 50 MG: 10 SOLUTION INTRAVENOUS at 12:11

## 2023-11-21 NOTE — ANESTHESIA PREPROCEDURE EVALUATION
11/21/2023      Pre-op Assessment    I have reviewed the Patient Summary Reports.     I have reviewed the Nursing Notes. I have reviewed the NPO Status.   I have reviewed the Medications.     Review of Systems  Anesthesia Hx:  No problems with previous Anesthesia                Social:  Non-Smoker, No Alcohol Use       Cardiovascular:  Cardiovascular Normal Exercise tolerance: good                                           Pulmonary:  Pulmonary Normal                       Renal/:  Renal/ Normal                 Hepatic/GI:  Bowel Prep.      celiac disease           Musculoskeletal:  Arthritis          Spine Disorders:  Disc disease and Degenerative disease           Neurological:  Neurology Normal                                      Endocrine:  Endocrine Normal            Psych:  Psychiatric History  depression                Physical Exam  General: Well nourished and Cooperative    Airway:  Mallampati: II   Mouth Opening: Normal  TM Distance: Normal  Tongue: Normal  Neck ROM: Normal ROM    Dental:  Intact        Anesthesia Plan  Type of Anesthesia, risks & benefits discussed:    Anesthesia Type: Gen Natural Airway, MAC  Intra-op Monitoring Plan: Standard ASA Monitors  Induction:  IV  Informed Consent: Informed consent signed with the Patient and all parties understand the risks and agree with anesthesia plan.  All questions answered.   ASA Score: 2  Day of Surgery Review of History & Physical: H&P Update referred to the surgeon/provider.I have interviewed and examined the patient. I have reviewed the patient's H&P dated: There are no significant changes.     Ready For Surgery From Anesthesia Perspective.     .

## 2023-11-21 NOTE — H&P
PRE PROCEDURE H&P    Patient Name: Meg Goff  MRN: [id number]  : 1960  Date of Procedure:  2023  Referring Physician: Maureen Santacruz MD  Primary Physician: Pablo Ng MD  Procedure Physician: Maureen Santacruz MD       Planned Procedure: Small Bowel Enteroscopy  Diagnosis: intermittent diarrhea      Chief Complaint: Same as above    HPI: Patient is an 63 y.o. female is here for the above. Last seen 10/31/23 and underwent colonoscopy for diarrhea. Colon biopsies notable for increased intraepithelial lymphocytes. She has a hx of celiac sprue and was still experiencing the loose stools despite gluten free diet. In reviewing her medication list, I asked her to stop Dupilumab which sometimes can cause the histology seen on colon biopsies or possibly lymphocytic colitis. Since last seen reports he diarrhea has improved. Only occurs once a week. Trying to avoid caffeine but enjoys coffee. She is regaining weight. Stopped Dupilumab 3 weeks ago.  at bedside.     Last colonoscopy: 2023  Family history: none  Anticoagulation: none    Past Medical History:   Past Medical History:   Diagnosis Date    Atopic dermatitis 2023    Celiac disease     DDD (degenerative disc disease), cervical     Duodenitis     Frozen shoulder syndrome     H. pylori infection     Major depressive disorder with current active episode 2021    Menopausal symptoms         Past Surgical History:  Past Surgical History:   Procedure Laterality Date     SECTION      COLONOSCOPY      COLONOSCOPY N/A 2016    Procedure: COLONOSCOPY;  Surgeon: Roberta Boateng MD;  Location: [facility];  Service: Endoscopy;  Laterality: N/A;    COLONOSCOPY N/A 10/31/2023    Procedure: COLONOSCOPY;  Surgeon: Maureen Santacruz MD;  Location: [facility];  Service: Endoscopy;  Laterality: N/A;    ESOPHAGOGASTRODUODENOSCOPY  [id number]    ESOPHAGOGASTRODUODENOSCOPY Left 3/12/2020    Procedure: ESOPHAGOGASTRODUODENOSCOPY  (EGD);  Surgeon: Maureen Santacruz MD;  Location: [facility];  Service: Endoscopy;  Laterality: Left;    TUBAL LIGATION          Home Medications:  Prior to Admission medications    Medication Sig Start Date End Date Taking? Authorizing Provider   cetirizine (ZYRTEC) 10 MG tablet Take 10 mg by mouth once daily.   Yes Provider, Historical   cyanocobalamin (VITAMIN B-12) 100 MCG tablet Vitamin B-12 Take No date recorded No form recorded No frequency recorded No route recorded No set duration recorded No set duration amount recorded active No dosage strength recorded No dosage strength units of measure recorded   Yes Provider, Historical   fluticasone propionate (FLONASE) 50 mcg/actuation nasal spray 2 sprays (100 mcg total) by Each Nostril route once daily. 2/10/23  Yes Halie Magaña DNP   melatonin 10 mg Tab Take by mouth.   Yes Provider, Historical   ascorbic acid, vitamin C, (VITAMIN C) 100 MG tablet Vitamin C Take No date recorded No form recorded No frequency recorded No route recorded No set duration recorded No set duration amount recorded active No dosage strength recorded No dosage strength units of measure recorded    Provider, Historical   diphenhydrAMINE (BENYLIN) 12.5 mg/5 mL liquid Take 12.5 mg by mouth every 4 (four) hours as needed for Allergies.    Provider, Historical   dupilumab (DUPIXENT PEN) 300 mg/2 mL PnIj Inject 300 mg into the skin every 14 (fourteen) days.    Provider, Historical   pimecrolimus (ELIDEL) 1 % cream Apply topically 2 (two) times daily. 8/23/23   Naomi Mcguire MD   triamcinolone acetonide 0.1% (KENALOG) 0.1 % cream Apply to AA on body BID x 2-3 weeks, then take a break-use as needed for flares. 5/16/23   Naomi Mcguire MD        Allergies:  Review of patient's allergies indicates:   Allergen Reactions    Gluten protein     Sulfa (sulfonamide antibiotics)     Sulfur         Social History:   Social History     Socioeconomic History    Marital status:   "  Tobacco Use    Smoking status: Never    Smokeless tobacco: Never   Substance and Sexual Activity    Alcohol use: Yes     Comment: rarely    Drug use: No       Family History:  Family History   Problem Relation Age of Onset    Lung cancer Mother     Heart failure Father     Anuerysm Father         abd    Seizures Paternal Aunt     Breast cancer Sister     Ovarian cancer Sister     Allergic rhinitis Neg Hx     Asthma Neg Hx     Atopy Neg Hx     Eczema Neg Hx     Immunodeficiency Neg Hx     Rhinitis Neg Hx     Urticaria Neg Hx     Angioedema Neg Hx     Allergies Neg Hx        ROS: No acute cardiac events, no acute respiratory complaints.     Physical Exam (all patients):    BP (!) 145/65 (BP Location: Left arm, Patient Position: Lying)   Pulse (!) 50   Temp 97.7 °F (36.5 °C) (Temporal)   Resp 16   Ht 5' 4" (1.626 m)   Wt 69.4 kg (153 lb)   SpO2 99%   Breastfeeding No   BMI 26.26 kg/m²   Lungs: Clear to auscultation bilaterally, respirations unlabored  Heart: Regular rate and rhythm, S1 and S2 normal, no obvious murmurs  Abdomen:         Soft, non-tender, bowel sounds normal, no masses, no organomegaly    Lab Results   Component Value Date    WBC 6.62 09/08/2022    MCV 92 09/08/2022    RDW 14.4 09/08/2022     09/08/2022    INR 1.0 03/02/2020    GLU 91 09/08/2022    BUN 16 09/08/2022     09/08/2022    K 4.7 09/08/2022     09/08/2022        SEDATION PLAN: per anesthesia      History reviewed, vital signs satisfactory, cardiopulmonary status satisfactory, sedation options, risks and plans have been discussed with the patient  All their questions were answered and the patient agrees to the sedation procedures as planned and the patient is deemed an appropriate candidate for the sedation as planned.    The risks, benefits and alternatives of the procedure were discussed with the patient in detail. This discussion was had in the presence of her  and endoscopy staff. The risks include, risks " of adverse reaction to sedation requiring the use of reversal agents, bleeding requiring blood transfusion, perforation requiring surgical intervention and technical failure. Other risks include aspiration leading to respiratory distress and respiratory failure resulting in endotracheal intubation and mechanical ventilation including death. If anesthesia is being utilized for this procedure, it is up to the anesthesiologist to determine airway safety including elective endotracheal intubation. Questions were answered, they agree to proceed. There was no language barriers.      Procedure explained to patient, informed consent obtained and placed in chart.    Maureen Santacruz  11/21/2023  12:39 PM

## 2023-11-21 NOTE — PLAN OF CARE
Dr Santacruz at bedside to update patient on results and recs. Ready for discharge. AVS and sedation precautions read and given to patient.

## 2023-11-21 NOTE — TRANSFER OF CARE
"Anesthesia Transfer of Care Note    Patient: Meg Goff    Procedure(s) Performed: Procedure(s) (LRB):  SBE (ENTEROSCOPY) (N/A)    Patient location: GI    Anesthesia Type: MAC    Transport from OR: Transported from OR on room air with adequate spontaneous ventilation    Post pain: adequate analgesia    Post assessment: no apparent anesthetic complications and tolerated procedure well    Post vital signs: stable    Level of consciousness: responds to stimulation    Nausea/Vomiting: no nausea/vomiting    Complications: none    Transfer of care protocol was followed    Last vitals: Visit Vitals  BP (!) 114/59   Pulse 60   Temp 37.2 °C (99 °F)   Resp 16   Ht 5' 4" (1.626 m)   Wt 69.4 kg (153 lb)   SpO2 97%   Breastfeeding No   BMI 26.26 kg/m²     "

## 2023-11-21 NOTE — PROVATION PATIENT INSTRUCTIONS
Discharge Summary/Instructions after an Endoscopic Procedure  Patient Name: Meg Goff  Patient MRN: [id number]  Patient YOB: 1960 Tuesday, November 21, 2023 Maureen Santacruz MD  Dear patient,  As a result of recent federal legislation (The Federal Cures Act), you may   receive lab or pathology results from your procedure in your [facility]   account before your physician is able to contact you. Your physician or   their representative will relay the results to you with their   recommendations at their soonest availability.  Thank you,  RESTRICTIONS:  During your procedure today, you received medications for sedation.  These   medications may affect your judgment, balance and coordination.  Therefore,   for 24 hours, you have the following restrictions:   - DO NOT drive a car, operate machinery, make legal/financial decisions,   sign important papers or drink alcohol.    ACTIVITY:  Today: no heavy lifting, straining or running due to procedural   sedation/anesthesia.  The following day: return to full activity including work.  DIET:  Eat and drink normally unless instructed otherwise.     TREATMENT FOR COMMON SIDE EFFECTS:  - Mild abdominal pain, nausea, belching, bloating or excessive gas:  rest,   eat lightly and use a heating pad.  - Sore Throat: treat with throat lozenges and/or gargle with warm salt   water.  - Because air was used during the procedure, expelling large amounts of air   from your rectum or belching is normal.  - If a bowel prep was taken, you may not have a bowel movement for 1-3 days.    This is normal.  SYMPTOMS TO WATCH FOR AND REPORT TO YOUR PHYSICIAN:  1. Abdominal pain or bloating, other than gas cramps.  2. Chest pain.  3. Back pain.  4. Signs of infection such as: chills or fever occurring within 24 hours   after the procedure.  5. Rectal bleeding, which would show as bright red, maroon, or black stools.   (A tablespoon of blood from the rectum is not serious, especially  if   hemorrhoids are present.)  6. Vomiting.  7. Weakness or dizziness.  GO DIRECTLY TO THE NEAREST EMERGENCY ROOM IF YOU HAVE ANY OF THE FOLLOWING:      Difficulty breathing              Chills and/or fever over 101 F   Persistent vomiting and/or vomiting blood   Severe abdominal pain   Severe chest pain   Black, tarry stools   Bleeding- more than one tablespoon   Any other symptom or condition that you feel may need urgent attention  Your doctor recommends these additional instructions:  If any biopsies were taken, your doctors clinic will contact you in 1 to 2   weeks with any results.  - Discharge patient to home (with escort).  For questions, problems or results please call your physician Maureen Santacruz MD at Work:  [phone]  If you have any questions about the above instructions, call the GI   department at [phone] or call the endoscopy unit at [phone]   from 7am until 3 pm.  [facility], EMERGENCY ROOM PHONE NUMBER:   [phone]  IF A COMPLICATION OR EMERGENCY SITUATION ARISES AND YOU ARE UNABLE TO REACH   YOUR PHYSICIAN - GO DIRECTLY TO THE EMERGENCY ROOM.  I have read or have had read to me these discharge instructions for my   procedure and have received a written copy.  I understand these   instructions and will follow-up with my physician if I have any questions.     __________________________________       _____________________________________  Nurse Signature                                          Patient/Designated   Responsible Party Signature  MD Maureen Manzo MD  11/21/2023 1:04:58 PM  This report has been verified and signed electronically.  Dear patient,  As a result of recent federal legislation (The Federal Cures Act), you may   receive lab or pathology results from your procedure in your [facility]   account before your physician is able to contact you. Your physician or   their representative will relay the results to you  with their   recommendations at their soonest availability.  Thank you,  PROVATION

## 2023-11-22 VITALS
WEIGHT: 153 LBS | HEART RATE: 55 BPM | DIASTOLIC BLOOD PRESSURE: 60 MMHG | OXYGEN SATURATION: 99 % | RESPIRATION RATE: 18 BRPM | SYSTOLIC BLOOD PRESSURE: 121 MMHG | BODY MASS INDEX: 26.12 KG/M2 | HEIGHT: 64 IN | TEMPERATURE: 99 F

## 2023-11-22 LAB
FINAL PATHOLOGIC DIAGNOSIS: NORMAL
GROSS: NORMAL
Lab: NORMAL

## 2023-11-24 NOTE — PROGRESS NOTES
Your small bowel biopsies are normal. No further GI work up is recommended given your loose stools have improved on their own. Please try to avoid caffeinated products when possible. Please follow up with Dr. Ng.

## 2023-11-28 NOTE — ANESTHESIA POSTPROCEDURE EVALUATION
Anesthesia Post Evaluation    Patient: Meg Goff    Procedure(s) Performed: Procedure(s) (LRB):  SBE (ENTEROSCOPY) (N/A)    Final Anesthesia Type: MAC      Patient location during evaluation: GI PACU  Patient participation: Yes- Able to Participate  Level of consciousness: awake and alert and oriented  Post-procedure vital signs: reviewed and stable  Pain management: adequate  Airway patency: patent  MARICRUZ mitigation strategies: Multimodal analgesia  PONV status at discharge: No PONV  Anesthetic complications: no      Cardiovascular status: hemodynamically stable  Respiratory status: unassisted, spontaneous ventilation and room air  Hydration status: euvolemic  Follow-up not needed.      Vitals Value Taken Time   /60 11/21/23 1303   Temp 37 °C (98.6 °F) 11/21/23 1303   Pulse 55 11/21/23 1303   Resp 18 11/21/23 1303   SpO2 99 % 11/21/23 1303         Event Time   Out of Recovery 13:13:42         Pain/Kandy Score: No data recorded

## 2023-11-29 ENCOUNTER — PATIENT MESSAGE (OUTPATIENT)
Dept: GASTROENTEROLOGY | Facility: CLINIC | Age: 63
End: 2023-11-29
Payer: COMMERCIAL

## 2023-12-11 ENCOUNTER — OFFICE VISIT (OUTPATIENT)
Dept: GASTROENTEROLOGY | Facility: CLINIC | Age: 63
End: 2023-12-11
Payer: COMMERCIAL

## 2023-12-11 DIAGNOSIS — R19.7 DIARRHEA, UNSPECIFIED TYPE: Primary | ICD-10-CM

## 2023-12-11 DIAGNOSIS — T88.7XXA MEDICATION SIDE EFFECT: ICD-10-CM

## 2023-12-11 PROCEDURE — 99213 PR OFFICE/OUTPT VISIT, EST, LEVL III, 20-29 MIN: ICD-10-PCS | Mod: 95,,, | Performed by: NURSE PRACTITIONER

## 2023-12-11 PROCEDURE — 1160F PR REVIEW ALL MEDS BY PRESCRIBER/CLIN PHARMACIST DOCUMENTED: ICD-10-PCS | Mod: CPTII,95,, | Performed by: NURSE PRACTITIONER

## 2023-12-11 PROCEDURE — 1160F RVW MEDS BY RX/DR IN RCRD: CPT | Mod: CPTII,95,, | Performed by: NURSE PRACTITIONER

## 2023-12-11 PROCEDURE — 1159F MED LIST DOCD IN RCRD: CPT | Mod: CPTII,95,, | Performed by: NURSE PRACTITIONER

## 2023-12-11 PROCEDURE — 99213 OFFICE O/P EST LOW 20 MIN: CPT | Mod: 95,,, | Performed by: NURSE PRACTITIONER

## 2023-12-11 PROCEDURE — 1159F PR MEDICATION LIST DOCUMENTED IN MEDICAL RECORD: ICD-10-PCS | Mod: CPTII,95,, | Performed by: NURSE PRACTITIONER

## 2023-12-11 NOTE — PROGRESS NOTES
Clinic Follow Up:  [facility] Follow Up Note    Reason for Follow Up:  The primary encounter diagnosis was Diarrhea, unspecified type. A diagnosis of Medication side effect was also pertinent to this visit.    PCP: Pablo Ng       The patient location is: [place]    The chief complaint leading to consultation is: diarrhea     Visit type: audiovisual    Face to Face time with patient: 10 minutes   15 minutes of total time spent on the encounter, which includes face to face time and non-face to face time preparing to see the patient (eg, review of tests), Obtaining and/or reviewing separately obtained history, Documenting clinical information in the electronic or other health record, Independently interpreting results (not separately reported) and communicating results to the patient/family/caregiver, or Care coordination (not separately reported).         Each patient to whom he or she provides medical services by telemedicine is:  (1) informed of the relationship between the physician and patient and the respective role of any other health care provider with respect to management of the patient; and (2) notified that he or she may decline to receive medical services by telemedicine and may withdraw from such care at any time.    Notes:       HPI:  This is a 63 y.o. female here for follow up.   She had a colonoscopy that showed possible lymphocytic colitis vs medication effect.   She stopped Dupixent-- last injection was on 11/10/23. She has had improvements in diarrhea since discontinuing. She is having 2 stools per day. She was on this for eczema. She is not having any issues with it currently but will reach out to her dermatologist.     Review of Systems   Constitutional:  Negative for activity change and appetite change.        As per interval history above   Respiratory:  Negative for cough and shortness of breath.    Cardiovascular:  Negative for chest pain.   Gastrointestinal:   Negative for abdominal pain, constipation, diarrhea, nausea and vomiting.   Skin:  Negative for color change and rash.       Medical History:  Past Medical History:   Diagnosis Date    Atopic dermatitis 2023    Celiac disease     DDD (degenerative disc disease), cervical     Duodenitis     Frozen shoulder syndrome     H. pylori infection     Major depressive disorder with current active episode 2021    Menopausal symptoms        Surgical History:   Past Surgical History:   Procedure Laterality Date     SECTION      COLONOSCOPY      COLONOSCOPY N/A 2016    Procedure: COLONOSCOPY;  Surgeon: Roberta Boateng MD;  Location: [facility];  Service: Endoscopy;  Laterality: N/A;    COLONOSCOPY N/A 10/31/2023    Procedure: COLONOSCOPY;  Surgeon: Maureen Santacruz MD;  Location: [facility];  Service: Endoscopy;  Laterality: N/A;    ESOPHAGOGASTRODUODENOSCOPY  [id number]    ESOPHAGOGASTRODUODENOSCOPY Left 3/12/2020    Procedure: ESOPHAGOGASTRODUODENOSCOPY (EGD);  Surgeon: Maureen Santacruz MD;  Location: [facility];  Service: Endoscopy;  Laterality: Left;    ESOPHAGOGASTRODUODENOSCOPY N/A 2023    Procedure: SBE (ENTEROSCOPY);  Surgeon: Maureen Santacruz MD;  Location: [facility];  Service: Endoscopy;  Laterality: N/A;  Hx celiac disease, needs jejunal and duodenal biopsies, diarrhea    TUBAL LIGATION         Family History:   Family History   Problem Relation Age of Onset    Lung cancer Mother     Heart failure Father     Anuerysm Father         abd    Seizures Paternal Aunt     Breast cancer Sister     Ovarian cancer Sister     Allergic rhinitis Neg Hx     Asthma Neg Hx     Atopy Neg Hx     Eczema Neg Hx     Immunodeficiency Neg Hx     Rhinitis Neg Hx     Urticaria Neg Hx     Angioedema Neg Hx     Allergies Neg Hx        Social History:   Social History     Tobacco Use    Smoking status: Never    Smokeless tobacco: Never   Substance Use Topics    Alcohol use: Yes     Comment: rarely    Drug  use: No       Allergies:   Review of patient's allergies indicates:   Allergen Reactions    Gluten protein     Sulfa (sulfonamide antibiotics)     Sulfur        Home Medications:  Current Outpatient Medications on File Prior to Visit   Medication Sig Dispense Refill    ascorbic acid, vitamin C, (VITAMIN C) 100 MG tablet Vitamin C Take No date recorded No form recorded No frequency recorded No route recorded No set duration recorded No set duration amount recorded active No dosage strength recorded No dosage strength units of measure recorded      cetirizine (ZYRTEC) 10 MG tablet Take 10 mg by mouth once daily.      cyanocobalamin (VITAMIN B-12) 100 MCG tablet Vitamin B-12 Take No date recorded No form recorded No frequency recorded No route recorded No set duration recorded No set duration amount recorded active No dosage strength recorded No dosage strength units of measure recorded      diphenhydrAMINE (BENYLIN) 12.5 mg/5 mL liquid Take 12.5 mg by mouth every 4 (four) hours as needed for Allergies.      dupilumab (DUPIXENT PEN) 300 mg/2 mL PnIj Inject 300 mg into the skin every 14 (fourteen) days.      fluticasone propionate (FLONASE) 50 mcg/actuation nasal spray 2 sprays (100 mcg total) by Each Nostril route once daily. 18.2 mL 11    melatonin 10 mg Tab Take by mouth.      pimecrolimus (ELIDEL) 1 % cream Apply topically 2 (two) times daily. 60 g 1    triamcinolone acetonide 0.1% (KENALOG) 0.1 % cream Apply to AA on body BID x 2-3 weeks, then take a break-use as needed for flares. 454 g 2     No current facility-administered medications on file prior to visit.       There were no vitals taken for this visit.  There is no height or weight on file to calculate BMI.  Physical Exam  Constitutional:       General: She is not in acute distress.  HENT:      Head: Normocephalic.   Neurological:      General: No focal deficit present.      Mental Status: She is alert.   Psychiatric:         Mood and Affect: Mood normal.          Judgment: Judgment normal.         Labs: Pertinent labs reviewed.  CRC Screening:     Assessment:   1. Diarrhea, unspecified type    2. Medication side effect        Recommendations:   - she will continue to hold. She will notify me if diarrhea returns.   - she will notify her dermatologist if eczema returns. I am ok with a trial of her restarting it, however, will stop it if the diarrhea returns.     Diarrhea, unspecified type    Medication side effect    Return to Clinic:  Follow up if symptoms worsen or fail to improve.    Thank you for the opportunity to participate in the care of this patient.  CALLY Hall

## 2024-03-07 RX ORDER — FLUTICASONE PROPIONATE 50 MCG
2 SPRAY, SUSPENSION (ML) NASAL DAILY
Qty: 48 G | Refills: 1 | Status: SHIPPED | OUTPATIENT
Start: 2024-03-07

## 2024-03-07 NOTE — TELEPHONE ENCOUNTER
No care due was identified.  [facility] Embedded Care Due Messages. Reference number: [id number].   3/07/2024 12:32:05 PM CST

## 2024-03-07 NOTE — TELEPHONE ENCOUNTER
Refill Decision Note   Meg Goff  is requesting a refill authorization.  Brief Assessment and Rationale for Refill:  Approve     Medication Therapy Plan:         Comments:     Note composed:1:26 PM 03/07/2024

## 2024-03-25 ENCOUNTER — OFFICE VISIT (OUTPATIENT)
Dept: FAMILY MEDICINE | Facility: CLINIC | Age: 64
End: 2024-03-25
Payer: COMMERCIAL

## 2024-03-25 ENCOUNTER — CLINICAL SUPPORT (OUTPATIENT)
Dept: FAMILY MEDICINE | Facility: CLINIC | Age: 64
End: 2024-03-25
Payer: COMMERCIAL

## 2024-03-25 VITALS
WEIGHT: 165.63 LBS | HEART RATE: 84 BPM | SYSTOLIC BLOOD PRESSURE: 150 MMHG | HEIGHT: 64 IN | DIASTOLIC BLOOD PRESSURE: 70 MMHG | BODY MASS INDEX: 28.28 KG/M2 | TEMPERATURE: 98 F

## 2024-03-25 DIAGNOSIS — Z86.73 REMOTE HISTORY OF TIA: ICD-10-CM

## 2024-03-25 DIAGNOSIS — Z13.1 DIABETES MELLITUS SCREENING: ICD-10-CM

## 2024-03-25 DIAGNOSIS — R07.9 CHEST PAIN, UNSPECIFIED TYPE: Primary | ICD-10-CM

## 2024-03-25 DIAGNOSIS — K90.0 CELIAC DISEASE: ICD-10-CM

## 2024-03-25 DIAGNOSIS — R03.0 ELEVATED BLOOD-PRESSURE READING WITHOUT DIAGNOSIS OF HYPERTENSION: ICD-10-CM

## 2024-03-25 DIAGNOSIS — R07.9 CHEST PAIN, UNSPECIFIED TYPE: ICD-10-CM

## 2024-03-25 LAB
OHS QRS DURATION: 80 MS
OHS QTC CALCULATION: 416 MS

## 2024-03-25 PROCEDURE — 93010 ELECTROCARDIOGRAM REPORT: CPT | Mod: S$GLB,,, | Performed by: INTERNAL MEDICINE

## 2024-03-25 PROCEDURE — 99214 OFFICE O/P EST MOD 30 MIN: CPT | Mod: S$GLB,,, | Performed by: FAMILY MEDICINE

## 2024-03-25 PROCEDURE — 1159F MED LIST DOCD IN RCRD: CPT | Mod: CPTII,S$GLB,, | Performed by: FAMILY MEDICINE

## 2024-03-25 PROCEDURE — 99999 PR PBB SHADOW E&M-EST. PATIENT-LVL I: CPT | Mod: PBBFAC,,,

## 2024-03-25 PROCEDURE — 3008F BODY MASS INDEX DOCD: CPT | Mod: CPTII,S$GLB,, | Performed by: FAMILY MEDICINE

## 2024-03-25 PROCEDURE — 93005 ELECTROCARDIOGRAM TRACING: CPT | Mod: S$GLB,,, | Performed by: FAMILY MEDICINE

## 2024-03-25 PROCEDURE — 99999 PR PBB SHADOW E&M-EST. PATIENT-LVL III: CPT | Mod: PBBFAC,,, | Performed by: FAMILY MEDICINE

## 2024-03-25 PROCEDURE — 3077F SYST BP >= 140 MM HG: CPT | Mod: CPTII,S$GLB,, | Performed by: FAMILY MEDICINE

## 2024-03-25 PROCEDURE — 3078F DIAST BP <80 MM HG: CPT | Mod: CPTII,S$GLB,, | Performed by: FAMILY MEDICINE

## 2024-03-25 RX ORDER — PANTOPRAZOLE SODIUM 40 MG/1
40 TABLET, DELAYED RELEASE ORAL DAILY
Qty: 30 TABLET | Refills: 0 | Status: SHIPPED | OUTPATIENT
Start: 2024-03-25 | End: 2025-03-25

## 2024-03-25 RX ORDER — ASPIRIN 81 MG/1
81 TABLET ORAL DAILY
COMMUNITY
Start: 2024-03-25

## 2024-03-25 NOTE — PATIENT INSTRUCTIONS
10 ways to control high blood pressure without medication  By making these 10 lifestyle changes, you can lower your blood pressure and reduce your risk of heart disease.    By [facility] Staff  If you have high blood pressure, you may wonder if medication is necessary to bring the numbers down. But lifestyle plays a vital role in treating high blood pressure. Controlling blood pressure with a healthy lifestyle might prevent, delay or reduce the need for medication.    Here are 10 lifestyle changes that can lower blood pressure and keep it down.    1. Lose extra pounds and watch your waistline  Blood pressure often increases as weight increases. Being overweight also can cause disrupted breathing while you sleep (sleep apnea), which further raises blood pressure.    Weight loss is one of the most effective lifestyle changes for controlling blood pressure. If you're overweight or have obesity, losing even a small amount of weight can help reduce blood pressure. In general, blood pressure might go down by about 1 millimeter of mercury (mm Hg) with each kilogram (about 2.2 pounds) of weight lost.    Also, the size of the waistline is important. Carrying too much weight around the waist can increase the risk of high blood pressure.    In general:    Men are at risk if their waist measurement is greater than 40 inches (102 centimeters).  Women are at risk if their waist measurement is greater than 35 inches (89 centimeters).  These numbers vary among ethnic groups. Ask your health care provider about a healthy waist measurement for you.    2. Exercise regularly  Regular physical activity can lower high blood pressure by about 5 to 8 mm Hg. It's important to keep exercising to keep blood pressure from rising again. As a general goal, aim for at least 30 minutes of moderate physical activity every day.    Exercise can also help keep elevated blood pressure from turning into high blood pressure (hypertension). For those who  have hypertension, regular physical activity can bring blood pressure down to safer levels.    Some examples of aerobic exercise that can help lower blood pressure include walking, jogging, cycling, swimming or dancing. Another possibility is high-intensity interval training. This type of training involves alternating short bursts of intense activity with periods of lighter activity.    Strength training also can help reduce blood pressure. Aim to include strength training exercises at least two days a week. Talk to a health care provider about developing an exercise program.    3. Eat a healthy diet  Eating a diet rich in whole grains, fruits, vegetables and low-fat dairy products and low in saturated fat and cholesterol can lower high blood pressure by up to 11 mm Hg. Examples of eating plans that can help control blood pressure are the Dietary Approaches to Stop Hypertension (DASH) diet and the Mediterranean diet.    Potassium in the diet can lessen the effects of salt (sodium) on blood pressure. The best sources of potassium are foods, such as fruits and vegetables, rather than supplements. Aim for 3,500 to 5,000 mg a day, which might lower blood pressure 4 to 5 mm Hg. Ask your care provider how much potassium you should have.    4. Reduce salt (sodium) in your diet  Even a small reduction of sodium in the diet can improve heart health and reduce high blood pressure by about 5 to 6 mm Hg.    The effect of sodium intake on blood pressure varies among groups of people. In general, limit sodium to 2,300 milligrams (mg) a day or less. However, a lower sodium intake -- 1,500 mg a day or less -- is ideal for most adults.    To reduce sodium in the diet:    Read food labels. Look for low-sodium versions of foods and beverages.  Eat fewer processed foods. Only a small amount of sodium occurs naturally in foods. Most sodium is added during processing.  Don't add salt. Use herbs or spices to add flavor to food.  Cook.  Cooking lets you control the amount of sodium in the food.    5. Limit alcohol  Limiting alcohol to less than one drink a day for women or two drinks a day for men can help lower blood pressure by about 4 mm Hg. One drink equals 12 ounces of beer, 5 ounces of wine or 1.5 ounces of 80-proof liquor.    But drinking too much alcohol can raise blood pressure by several points. It can also reduce the effectiveness of blood pressure medications.    6. Quit smoking  Smoking increases blood pressure. Stopping smoking helps lower blood pressure. It can also reduce the risk of heart disease and improve overall health, possibly leading to a longer life.    7. Get a good night's sleep  Poor sleep quality -- getting fewer than six hours of sleep every night for several weeks -- can contribute to hypertension. A number of issues can disrupt sleep, including sleep apnea, restless leg syndrome and general sleeplessness (insomnia).    Let your health care provider know if you often have trouble sleeping. Finding and treating the cause can help improve sleep. However, if you don't have sleep apnea or restless leg syndrome, follow these simple tips for getting more restful sleep.    Stick to a sleep schedule. Go to bed and wake up the same time each day. Try to keep the same schedule on weeknights and on weekends.  Create a restful space. That means keeping the sleeping space cool, quiet and dark. Do something relaxing in the hour before bedtime. That might include taking a warm bath or doing relaxation exercises. Avoid bright light, such as from a TV or computer screen.  Watch what you eat and drink. Don't go to bed hungry or stuffed. Avoid large meals close to bedtime. Limit or avoid nicotine, caffeine and alcohol close to bedtime, as well.  Limit naps. For those who find napping during the day helpful, limiting naps to 30 minutes earlier in the day might help nighttime sleep.    8. Reduce stress  Long-term (chronic) emotional  stress may contribute to high blood pressure. More research is needed on the effects of stress reduction techniques to find out whether they can reduce blood pressure.    However, it can't hurt to determine what causes stress, such as work, family, finances or illness, and find ways to reduce stress. Try the following:    Avoid trying to do too much. Plan your day and focus on your priorities. Learn to say no. Allow enough time to get done what needs to be done.  Focus on issues you can control and make plans to solve them. For an issue at work, talk to a supervisor. For conflict with kids or spouse, find ways to resolve it.  Avoid stress triggers. For example, if rush-hour traffic causes stress, travel at a different time or take public transportation. Avoid people who cause stress if possible.  Make time to relax. Take time each day to sit quietly and breathe deeply. Make time for enjoyable activities or hobbies, such as taking a walk, cooking or volunteering.  Practice gratitude. Expressing gratitude to others can help reduce stress.    9. Monitor your blood pressure at home and get regular checkups  Home monitoring can help you keep tabs on your blood pressure. It can make certain your medications and lifestyle changes are working.    Home blood pressure monitors are available widely and without a prescription. Talk to a health care provider about home monitoring before you get started.    Regular visits with a provider are also key to controlling blood pressure. If your blood pressure is well controlled, ask your provider how often you need to check it. You might be able to check it only once a day or less often.    10. Get support  Supportive family and friends are important to good health. They may encourage you to take care of yourself, drive you to the care provider's office or start an exercise program with you to keep your blood pressure low.    If you find you need support beyond your family and friends,  consider joining a support group. This may put you in touch with people who can give you an emotional or morale boost and who can offer practical tips to cope with your condition.

## 2024-03-26 NOTE — PROGRESS NOTES
Patient states that she is woken up a couple of times in the past couple weeks with chest pressure lasting about 10 minutes.  She had some recent chest soreness which lasted for few hours.  She has not had obvious exertional symptoms.  No obvious reflux.  She did have some discomfort in her right arm the next day after 1 of these episodes.  No nausea vomiting shortness of breath palpitation.  Remote history TIA .  She does have celiac disease currently controlled.  Blood pressure elevated today.    Meg was seen today for chest pain.    Diagnoses and all orders for this visit:    Chest pain, unspecified type  -     Stress Echo Which stress agent will be used? Treadmill Exercise; Color Flow Doppler? No; Future  -     EKG 12-LEAD - Muse; Future  -     Lipid Panel; Future  -     Comprehensive Metabolic Panel; Future  -     CBC Auto Differential; Future    Remote history of TIA    Celiac disease    Diabetes mellitus screening  -     Hemoglobin A1C; Future    Elevated blood-pressure reading without diagnosis of hypertension    Other orders  -     pantoprazole (PROTONIX) 40 MG tablet; Take 1 tablet (40 mg total) by mouth once daily.  -     aspirin (ECOTRIN) 81 MG EC tablet; Take 1 tablet (81 mg total) by mouth once daily.      Follow-up appointment 4 weeks.  Monitor blood pressure.            Past Medical History:  Past Medical History:   Diagnosis Date    Atopic dermatitis 2023    Celiac disease     DDD (degenerative disc disease), cervical     Duodenitis     Frozen shoulder syndrome     H. pylori infection     Major depressive disorder with current active episode 2021    Menopausal symptoms     Remote history of TIA 2008     Past Surgical History:   Procedure Laterality Date     SECTION      COLONOSCOPY      COLONOSCOPY N/A 2016    Procedure: COLONOSCOPY;  Surgeon: Roberta Boateng MD;  Location: [facility];  Service: Endoscopy;  Laterality: N/A;    COLONOSCOPY N/A 10/31/2023     Procedure: COLONOSCOPY;  Surgeon: Maureen Santacruz MD;  Location: [facility];  Service: Endoscopy;  Laterality: N/A;    ESOPHAGOGASTRODUODENOSCOPY  [id number]    ESOPHAGOGASTRODUODENOSCOPY Left 3/12/2020    Procedure: ESOPHAGOGASTRODUODENOSCOPY (EGD);  Surgeon: Maureen Santacruz MD;  Location: [facility];  Service: Endoscopy;  Laterality: Left;    ESOPHAGOGASTRODUODENOSCOPY N/A 11/21/2023    Procedure: SBE (ENTEROSCOPY);  Surgeon: Maureen Santacruz MD;  Location: [facility];  Service: Endoscopy;  Laterality: N/A;  Hx celiac disease, needs jejunal and duodenal biopsies, diarrhea    TUBAL LIGATION       Review of patient's allergies indicates:   Allergen Reactions    Gluten protein     Sulfa (sulfonamide antibiotics)     Sulfur      Current Outpatient Medications on File Prior to Visit   Medication Sig Dispense Refill    cetirizine (ZYRTEC) 10 MG tablet Take 10 mg by mouth once daily.      fluticasone propionate (FLONASE) 50 mcg/actuation nasal spray 2 sprays (100 mcg total) by Each Nostril route once daily. 48 g 1    melatonin 10 mg Tab Take by mouth.      ascorbic acid, vitamin C, (VITAMIN C) 100 MG tablet Vitamin C Take No date recorded No form recorded No frequency recorded No route recorded No set duration recorded No set duration amount recorded active No dosage strength recorded No dosage strength units of measure recorded      [DISCONTINUED] cyanocobalamin (VITAMIN B-12) 100 MCG tablet Vitamin B-12 Take No date recorded No form recorded No frequency recorded No route recorded No set duration recorded No set duration amount recorded active No dosage strength recorded No dosage strength units of measure recorded      [DISCONTINUED] diphenhydrAMINE (BENYLIN) 12.5 mg/5 mL liquid Take 12.5 mg by mouth every 4 (four) hours as needed for Allergies.      [DISCONTINUED] pimecrolimus (ELIDEL) 1 % cream Apply topically 2 (two) times daily. 60 g 1    [DISCONTINUED] triamcinolone acetonide 0.1% (KENALOG) 0.1 % cream  Apply to AA on body BID x 2-3 weeks, then take a break-use as needed for flares. 454 g 2     No current facility-administered medications on file prior to visit.     Social History     Socioeconomic History    Marital status:    Tobacco Use    Smoking status: Never    Smokeless tobacco: Never   Substance and Sexual Activity    Alcohol use: Yes     Comment: rarely    Drug use: No     Social Determinants of Health     Food Insecurity: No Food Insecurity (3/25/2024)    Hunger Vital Sign     Worried About Running Out of Food in the Last Year: Never true     Ran Out of Food in the Last Year: Never true   Transportation Needs: No Transportation Needs (3/25/2024)    PRAPARE - Transportation     Lack of Transportation (Medical): No     Lack of Transportation (Non-Medical): No   Physical Activity: Inactive (3/25/2024)    Exercise Vital Sign     Days of Exercise per Week: 0 days     Minutes of Exercise per Session: 0 min   Stress: Stress Concern Present (3/25/2024)    [employer] [place] [employer] - Occupational Stress Questionnaire     Feeling of Stress : To some extent   Social Connections: Unknown (3/25/2024)    Social Connection and Isolation Panel [NHANES]     Frequency of Communication with Friends and Family: Three times a week     Frequency of Social Gatherings with Friends and Family: Once a week     Active Member of Clubs or Organizations: No     Attends Club or Organization Meetings: Never     Marital Status:    Housing Stability: Low Risk  (3/25/2024)    Housing Stability Vital Sign     Unable to Pay for Housing in the Last Year: No     Number of Places Lived in the Last Year: 1     Unstable Housing in the Last Year: No     Family History   Problem Relation Age of Onset    Lung cancer Mother     Heart failure Father     Anuerysm Father         abd    Seizures Paternal Aunt     Breast cancer Sister     Ovarian cancer Sister     Allergic rhinitis Neg Hx     Asthma Neg Hx     Atopy Neg Hx      "Eczema Neg Hx     Immunodeficiency Neg Hx     Rhinitis Neg Hx     Urticaria Neg Hx     Angioedema Neg Hx     Allergies Neg Hx            ROS:  GENERAL: No fever, chills,  or significant weight changes.   CARDIOVASCULAR: Denies exertional chest pain, PND, orthopnea or reduced exercise tolerance.  ABDOMEN: Appetite fine. Denies diarrhea, abdominal pain, hematemesis or blood in stool.  URINARY: No flank pain, dysuria or hematuria.    Vitals:    03/25/24 1352 03/25/24 1442   BP: (!) 151/90 (!) 150/70   Pulse: 84    Temp: 97.6 °F (36.4 °C)    TempSrc: Temporal    Weight: 75.1 kg (165 lb 9.6 oz)    Height: 5' 4" (1.626 m)      Wt Readings from Last 3 Encounters:   03/25/24 75.1 kg (165 lb 9.6 oz)   11/21/23 69.4 kg (153 lb)   10/31/23 68.5 kg (151 lb)       OBJECTIVE:   APPEARANCE: Well nourished, well developed, in no acute distress.    HEAD: Normocephalic.  Atraumatic.  No sinus tenderness.  EYES:   Right eye: Pupil reactive.  Conjunctiva clear.    Left eye: Pupil reactive.  Conjunctiva clear.  EOMI.    EARS: TM's intact. Light reflex normal. No retraction or perforation.    NOSE:  clear.  MOUTH & THROAT:  No pharyngeal erythema or exudate. No lesions.  NECK: Supple. No bruits.  No JVD.  No cervical lymphadenopathy.  No thyromegaly.    CHEST: Breath sounds clear bilaterally.  Normal respiratory effort  CARDIOVASCULAR: Normal rate.  Regular rhythm.  No murmurs.  No rub.  No gallops.  ABDOMEN: Bowel sounds normal.  Soft.  No tenderness.  No organomegaly.  PERIPHERAL VASCULAR: No cyanosis.  No clubbing.  No edema.  NEUROLOGIC: No focal findings.  MENTAL STATUS: Alert.  Oriented x 3.        "

## 2024-03-27 ENCOUNTER — HOSPITAL ENCOUNTER (OUTPATIENT)
Dept: CARDIOLOGY | Facility: HOSPITAL | Age: 64
Discharge: HOME | End: 2024-03-27
Attending: FAMILY MEDICINE
Payer: COMMERCIAL

## 2024-03-27 VITALS
DIASTOLIC BLOOD PRESSURE: 71 MMHG | BODY MASS INDEX: 28.17 KG/M2 | HEIGHT: 64 IN | SYSTOLIC BLOOD PRESSURE: 131 MMHG | WEIGHT: 165 LBS

## 2024-03-27 DIAGNOSIS — R07.9 CHEST PAIN, UNSPECIFIED TYPE: ICD-10-CM

## 2024-03-27 LAB
ASCENDING AORTA: 2.73 CM
AV INDEX (PROSTH): 0.76
AV MEAN GRADIENT: 6 MMHG
AV PEAK GRADIENT: 10 MMHG
AV REGURGITATION PRESSURE HALF TIME: 775.86 MS
AV VALVE AREA BY VELOCITY RATIO: 2.22 CM²
AV VALVE AREA: 2.06 CM²
AV VELOCITY RATIO: 0.82
BSA FOR ECHO PROCEDURE: 1.84 M2
CV ECHO LV RWT: 0.55 CM
CV STRESS BASE HR: 60 BPM
DIASTOLIC BLOOD PRESSURE: 71 MMHG
DOP CALC AO PEAK VEL: 1.6 M/S
DOP CALC AO VTI: 39.1 CM
DOP CALC LVOT AREA: 2.7 CM2
DOP CALC LVOT DIAMETER: 1.86 CM
DOP CALC LVOT PEAK VEL: 1.31 M/S
DOP CALC LVOT STROKE VOLUME: 80.66 CM3
DOP CALC MV VTI: 40.7 CM
DOP CALCLVOT PEAK VEL VTI: 29.7 CM
E WAVE DECELERATION TIME: 210.02 MSEC
E/A RATIO: 0.83
E/E' RATIO: 11.2 M/S
ECHO LV POSTERIOR WALL: 1.17 CM (ref 0.6–1.1)
FRACTIONAL SHORTENING: 45 % (ref 28–44)
INTERVENTRICULAR SEPTUM: 1.08 CM (ref 0.6–1.1)
IVC DIAMETER: 1.21 CM
LA MAJOR: 4.45 CM
LA MINOR: 4.67 CM
LA WIDTH: 3.5 CM
LEFT ATRIUM SIZE: 3.52 CM
LEFT ATRIUM VOLUME INDEX MOD: 19.9 ML/M2
LEFT ATRIUM VOLUME INDEX: 26.5 ML/M2
LEFT ATRIUM VOLUME MOD: 35.8 CM3
LEFT ATRIUM VOLUME: 47.72 CM3
LEFT INTERNAL DIMENSION IN SYSTOLE: 2.31 CM (ref 2.1–4)
LEFT VENTRICLE DIASTOLIC VOLUME INDEX: 44.43 ML/M2
LEFT VENTRICLE DIASTOLIC VOLUME: 79.97 ML
LEFT VENTRICLE MASS INDEX: 91 G/M2
LEFT VENTRICLE SYSTOLIC VOLUME INDEX: 10.1 ML/M2
LEFT VENTRICLE SYSTOLIC VOLUME: 18.23 ML
LEFT VENTRICULAR INTERNAL DIMENSION IN DIASTOLE: 4.23 CM (ref 3.5–6)
LEFT VENTRICULAR MASS: 164.01 G
LV LATERAL E/E' RATIO: 9.33 M/S
LV SEPTAL E/E' RATIO: 14 M/S
LVOT MG: 3.46 MMHG
LVOT MV: 0.87 CM/S
MV MEAN GRADIENT: 2 MMHG
MV PEAK A VEL: 1.01 M/S
MV PEAK E VEL: 0.84 M/S
MV PEAK GRADIENT: 5 MMHG
MV STENOSIS PRESSURE HALF TIME: 60.91 MS
MV VALVE AREA BY CONTINUITY EQUATION: 1.98 CM2
MV VALVE AREA P 1/2 METHOD: 3.61 CM2
OHS CV CPX 1 MINUTE RECOVERY HEART RATE: 136 BPM
OHS CV CPX 85 PERCENT MAX PREDICTED HEART RATE MALE: 133
OHS CV CPX ESTIMATED METS: 10
OHS CV CPX MAX PREDICTED HEART RATE: 156
OHS CV CPX PATIENT IS FEMALE: 1
OHS CV CPX PATIENT IS MALE: 0
OHS CV CPX PEAK DIASTOLIC BLOOD PRESSURE: 56 MMHG
OHS CV CPX PEAK HEAR RATE: 162 BPM
OHS CV CPX PEAK RATE PRESSURE PRODUCT: ABNORMAL
OHS CV CPX PEAK SYSTOLIC BLOOD PRESSURE: 150 MMHG
OHS CV CPX PERCENT MAX PREDICTED HEART RATE ACHIEVED: 108
OHS CV CPX RATE PRESSURE PRODUCT PRESENTING: 7860
PISA AR MAX VEL: 3.04 M/S
PISA MRMAX VEL: 5.07 M/S
PISA TR MAX VEL: 2.6 M/S
PV PEAK GRADIENT: 4 MMHG
PV PEAK VELOCITY: 1.03 M/S
RA MAJOR: 3.81 CM
RA PRESSURE ESTIMATED: 3 MMHG
RA WIDTH: 2.64 CM
RIGHT VENTRICULAR END-DIASTOLIC DIMENSION: 2.76 CM
RV TB RVSP: 6 MMHG
STJ: 2.72 CM
STRESS ECHO POST EXERCISE DUR MIN: 7 MINUTES
STRESS ECHO POST EXERCISE DUR SEC: 59 SECONDS
SYSTOLIC BLOOD PRESSURE: 131 MMHG
TDI LATERAL: 0.09 M/S
TDI SEPTAL: 0.06 M/S
TDI: 0.08 M/S
TR MAX PG: 27 MMHG
TRICUSPID ANNULAR PLANE SYSTOLIC EXCURSION: 1.99 CM
TV REST PULMONARY ARTERY PRESSURE: 30 MMHG
Z-SCORE OF LEFT VENTRICULAR DIMENSION IN END DIASTOLE: -1.63
Z-SCORE OF LEFT VENTRICULAR DIMENSION IN END SYSTOLE: -2.26

## 2024-03-27 PROCEDURE — 93351 STRESS TTE COMPLETE: CPT | Mod: 26,,, | Performed by: INTERNAL MEDICINE

## 2024-03-27 PROCEDURE — 93351 STRESS TTE COMPLETE: CPT | Mod: PO

## 2024-04-05 ENCOUNTER — TELEPHONE (OUTPATIENT)
Dept: FAMILY MEDICINE | Facility: CLINIC | Age: 64
End: 2024-04-05
Payer: COMMERCIAL

## 2024-04-05 DIAGNOSIS — E87.5 SERUM POTASSIUM ELEVATED: Primary | ICD-10-CM

## 2024-04-05 NOTE — TELEPHONE ENCOUNTER
----- Message from Pablo Ng MD sent at 4/5/2024 12:53 PM CDT -----  Potassium mildly elevated.  Kidney function test mildly decreased from previously.  Sugar elevated consistent with prediabetes, but not diabetic range.  No anemia.  Cholesterol looks okay.  Recommend make sure not taking any potassium supplements or salt substitutes.  Watch diet.  Recheck BMP 3 months. My nurse will contact you to arrange.  Thanks,  Dr. Ng

## 2024-04-22 ENCOUNTER — OFFICE VISIT (OUTPATIENT)
Dept: FAMILY MEDICINE | Facility: CLINIC | Age: 64
End: 2024-04-22
Payer: COMMERCIAL

## 2024-04-22 VITALS
BODY MASS INDEX: 28.51 KG/M2 | SYSTOLIC BLOOD PRESSURE: 135 MMHG | DIASTOLIC BLOOD PRESSURE: 70 MMHG | HEIGHT: 64 IN | WEIGHT: 167 LBS | TEMPERATURE: 97 F | HEART RATE: 59 BPM

## 2024-04-22 DIAGNOSIS — K21.9 GASTROESOPHAGEAL REFLUX DISEASE, UNSPECIFIED WHETHER ESOPHAGITIS PRESENT: ICD-10-CM

## 2024-04-22 DIAGNOSIS — E87.5 HYPERKALEMIA: ICD-10-CM

## 2024-04-22 DIAGNOSIS — R94.4 DECREASED CALCULATED GFR: ICD-10-CM

## 2024-04-22 DIAGNOSIS — R73.03 PREDIABETES: Primary | ICD-10-CM

## 2024-04-22 PROCEDURE — 3075F SYST BP GE 130 - 139MM HG: CPT | Mod: CPTII,S$GLB,, | Performed by: FAMILY MEDICINE

## 2024-04-22 PROCEDURE — 3008F BODY MASS INDEX DOCD: CPT | Mod: CPTII,S$GLB,, | Performed by: FAMILY MEDICINE

## 2024-04-22 PROCEDURE — 99214 OFFICE O/P EST MOD 30 MIN: CPT | Mod: S$GLB,,, | Performed by: FAMILY MEDICINE

## 2024-04-22 PROCEDURE — 99999 PR PBB SHADOW E&M-EST. PATIENT-LVL IV: CPT | Mod: PBBFAC,,, | Performed by: FAMILY MEDICINE

## 2024-04-22 PROCEDURE — G2211 COMPLEX E/M VISIT ADD ON: HCPCS | Mod: S$GLB,,, | Performed by: FAMILY MEDICINE

## 2024-04-22 PROCEDURE — 3078F DIAST BP <80 MM HG: CPT | Mod: CPTII,S$GLB,, | Performed by: FAMILY MEDICINE

## 2024-04-22 PROCEDURE — 1159F MED LIST DOCD IN RCRD: CPT | Mod: CPTII,S$GLB,, | Performed by: FAMILY MEDICINE

## 2024-04-22 PROCEDURE — 3044F HG A1C LEVEL LT 7.0%: CPT | Mod: CPTII,S$GLB,, | Performed by: FAMILY MEDICINE

## 2024-04-22 NOTE — PROGRESS NOTES
Patient presents follow-up on chest discomfort.  Her stress test was negative.  Symptoms seem to resolve with pantoprazole.  She was noted to have prediabetes and potassium was mildly elevated with mild decreased GFR.  Her home blood pressure looks pretty good.    Meg was seen today for follow-up.    Diagnoses and all orders for this visit:    Prediabetes    Gastroesophageal reflux disease, unspecified whether esophagitis present    Decreased calculated GFR    Hyperkalemia    She can continue with the pantoprazole.  She has follow-up BNP scheduled in .  Follow-up appointment 4 weeks.  Monitor blood pressure.        Visit today included increased complexity associated with the care of the episodic problem addressed and/or managing the longitudinal care of the patient due to the serious and/or complex managed problem(s) as per above diagnosis list.    Past Medical History:  Past Medical History:   Diagnosis Date    Atopic dermatitis 2023    Celiac disease     DDD (degenerative disc disease), cervical     Duodenitis     Frozen shoulder syndrome     H. pylori infection     Major depressive disorder with current active episode 2021    Menopausal symptoms     Remote history of TIA 2008     Past Surgical History:   Procedure Laterality Date     SECTION      COLONOSCOPY      COLONOSCOPY N/A 2016    Procedure: COLONOSCOPY;  Surgeon: Roberta Boateng MD;  Location: [facility];  Service: Endoscopy;  Laterality: N/A;    COLONOSCOPY N/A 10/31/2023    Procedure: COLONOSCOPY;  Surgeon: Maureen Santacruz MD;  Location: [facility];  Service: Endoscopy;  Laterality: N/A;    ESOPHAGOGASTRODUODENOSCOPY  [id number]    ESOPHAGOGASTRODUODENOSCOPY Left 3/12/2020    Procedure: ESOPHAGOGASTRODUODENOSCOPY (EGD);  Surgeon: Maureen Santacruz MD;  Location: [facility];  Service: Endoscopy;  Laterality: Left;    ESOPHAGOGASTRODUODENOSCOPY N/A 2023    Procedure: SBE (ENTEROSCOPY);  Surgeon: Noam  Maureen HEBERT MD;  Location: [facility];  Service: Endoscopy;  Laterality: N/A;  Hx celiac disease, needs jejunal and duodenal biopsies, diarrhea    TUBAL LIGATION       Review of patient's allergies indicates:   Allergen Reactions    Gluten protein     Sulfa (sulfonamide antibiotics)     Sulfur      Current Outpatient Medications on File Prior to Visit   Medication Sig Dispense Refill    aspirin (ECOTRIN) 81 MG EC tablet Take 1 tablet (81 mg total) by mouth once daily.      cetirizine (ZYRTEC) 10 MG tablet Take 10 mg by mouth once daily.      fluticasone propionate (FLONASE) 50 mcg/actuation nasal spray 2 sprays (100 mcg total) by Each Nostril route once daily. 48 g 1    melatonin 10 mg Tab Take by mouth.      pantoprazole (PROTONIX) 40 MG tablet Take 1 tablet (40 mg total) by mouth once daily. 30 tablet 0    ascorbic acid, vitamin C, (VITAMIN C) 100 MG tablet Vitamin C Take No date recorded No form recorded No frequency recorded No route recorded No set duration recorded No set duration amount recorded active No dosage strength recorded No dosage strength units of measure recorded       No current facility-administered medications on file prior to visit.     Social History     Socioeconomic History    Marital status:    Tobacco Use    Smoking status: Never    Smokeless tobacco: Never   Substance and Sexual Activity    Alcohol use: Yes     Comment: rarely    Drug use: No     Social Determinants of Health     Food Insecurity: No Food Insecurity (3/25/2024)    Hunger Vital Sign     Worried About Running Out of Food in the Last Year: Never true     Ran Out of Food in the Last Year: Never true   Transportation Needs: No Transportation Needs (3/25/2024)    PRAPARE - Transportation     Lack of Transportation (Medical): No     Lack of Transportation (Non-Medical): No   Physical Activity: Inactive (3/25/2024)    Exercise Vital Sign     Days of Exercise per Week: 0 days     Minutes of Exercise per Session: 0 min  "  Stress: Stress Concern Present (3/25/2024)    [employer] [place] [employer] - Occupational Stress Questionnaire     Feeling of Stress : To some extent   Social Connections: Unknown (3/25/2024)    Social Connection and Isolation Panel [NHANES]     Frequency of Communication with Friends and Family: Three times a week     Frequency of Social Gatherings with Friends and Family: Once a week     Active Member of Clubs or Organizations: No     Attends Club or Organization Meetings: Never     Marital Status:    Housing Stability: Low Risk  (3/25/2024)    Housing Stability Vital Sign     Unable to Pay for Housing in the Last Year: No     Number of Places Lived in the Last Year: 1     Unstable Housing in the Last Year: No     Family History   Problem Relation Name Age of Onset    Lung cancer Mother      Heart failure Father      Anuerysm Father          abd    Seizures Paternal Aunt      Breast cancer Sister      Ovarian cancer Sister      Allergic rhinitis Neg Hx      Asthma Neg Hx      Atopy Neg Hx      Eczema Neg Hx      Immunodeficiency Neg Hx      Rhinitis Neg Hx      Urticaria Neg Hx      Angioedema Neg Hx      Allergies Neg Hx             ROS:  GENERAL: No fever, chills,  or significant weight changes.   CARDIOVASCULAR: Denies exertional chest pain, PND, orthopnea or reduced exercise tolerance.  ABDOMEN: Appetite fine. Denies diarrhea, abdominal pain, hematemesis or blood in stool.  URINARY: No flank pain, dysuria or hematuria.    Vitals:    04/22/24 1316   BP: 135/70   Pulse: (!) 59   Temp: 97 °F (36.1 °C)   TempSrc: Temporal   Weight: 75.8 kg (167 lb)   Height: 5' 4" (1.626 m)     Wt Readings from Last 3 Encounters:   04/22/24 75.8 kg (167 lb)   03/27/24 74.8 kg (165 lb)   03/25/24 75.1 kg (165 lb 9.6 oz)       OBJECTIVE:   APPEARANCE: Well nourished, well developed, in no acute distress.    HEAD: Normocephalic.  Atraumatic.  No sinus tenderness.  EYES:   Right eye: Pupil reactive.  Conjunctiva " clear.    Left eye: Pupil reactive.  Conjunctiva clear.  EOMI.    EARS: TM's intact. Light reflex normal. No retraction or perforation.    NOSE:  clear.  MOUTH & THROAT:  No pharyngeal erythema or exudate. No lesions.  NECK: Supple. No bruits.  No JVD.  No cervical lymphadenopathy.  No thyromegaly.    CHEST: Breath sounds clear bilaterally.  Normal respiratory effort  CARDIOVASCULAR: Normal rate.  Regular rhythm.  No murmurs.  No rub.  No gallops.  ABDOMEN: Bowel sounds normal.  Soft.  No tenderness.  No organomegaly.  PERIPHERAL VASCULAR: No cyanosis.  No clubbing.  No edema.  NEUROLOGIC: No focal findings.  MENTAL STATUS: Alert.  Oriented x 3.

## 2024-04-29 ENCOUNTER — PATIENT MESSAGE (OUTPATIENT)
Dept: FAMILY MEDICINE | Facility: CLINIC | Age: 64
End: 2024-04-29
Payer: COMMERCIAL

## 2024-05-26 NOTE — TELEPHONE ENCOUNTER
----- Message from Chelsea Castellanos sent at 9/29/2023 12:41 PM CDT -----  Contact: Meg  .Patient is calling to speak with the nurse regarding questions and concerns. Reports wanting to know if the patient should start any medication and can the patient get a call with the results  . Please give patient a call back at .[phone].     
Duplicate message , responded to [employer] message.  
(2) more than 100 beats/min

## 2024-06-28 ENCOUNTER — LAB VISIT (OUTPATIENT)
Dept: LAB | Facility: HOSPITAL | Age: 64
End: 2024-06-28
Payer: COMMERCIAL

## 2024-06-28 DIAGNOSIS — E87.5 SERUM POTASSIUM ELEVATED: ICD-10-CM

## 2024-06-28 LAB
ANION GAP SERPL CALC-SCNC: 8 MMOL/L (ref 8–16)
BUN SERPL-MCNC: 18 MG/DL (ref 8–23)
CALCIUM SERPL-MCNC: 9.3 MG/DL (ref 8.7–10.5)
CHLORIDE SERPL-SCNC: 111 MMOL/L (ref 95–110)
CO2 SERPL-SCNC: 23 MMOL/L (ref 23–29)
CREAT SERPL-MCNC: 1.1 MG/DL (ref 0.5–1.4)
EST. GFR  (NO RACE VARIABLE): 56.1 ML/MIN/1.73 M^2
GLUCOSE SERPL-MCNC: 101 MG/DL (ref 70–110)
POTASSIUM SERPL-SCNC: 4.8 MMOL/L (ref 3.5–5.1)
SODIUM SERPL-SCNC: 142 MMOL/L (ref 136–145)

## 2024-06-28 PROCEDURE — 80048 BASIC METABOLIC PNL TOTAL CA: CPT | Performed by: FAMILY MEDICINE

## 2024-06-28 PROCEDURE — 36415 COLL VENOUS BLD VENIPUNCTURE: CPT | Mod: PO | Performed by: FAMILY MEDICINE

## 2024-09-09 ENCOUNTER — OFFICE VISIT (OUTPATIENT)
Dept: FAMILY MEDICINE | Facility: CLINIC | Age: 64
End: 2024-09-09
Payer: COMMERCIAL

## 2024-09-09 VITALS
HEIGHT: 64 IN | HEART RATE: 71 BPM | WEIGHT: 173.81 LBS | OXYGEN SATURATION: 99 % | TEMPERATURE: 99 F | DIASTOLIC BLOOD PRESSURE: 77 MMHG | SYSTOLIC BLOOD PRESSURE: 138 MMHG | BODY MASS INDEX: 29.67 KG/M2

## 2024-09-09 DIAGNOSIS — H10.9 CONJUNCTIVITIS, UNSPECIFIED CONJUNCTIVITIS TYPE, UNSPECIFIED LATERALITY: Primary | ICD-10-CM

## 2024-09-09 DIAGNOSIS — J32.9 SINUSITIS, UNSPECIFIED CHRONICITY, UNSPECIFIED LOCATION: ICD-10-CM

## 2024-09-09 PROCEDURE — 3078F DIAST BP <80 MM HG: CPT | Mod: CPTII,S$GLB,, | Performed by: NURSE PRACTITIONER

## 2024-09-09 PROCEDURE — 1160F RVW MEDS BY RX/DR IN RCRD: CPT | Mod: CPTII,S$GLB,, | Performed by: NURSE PRACTITIONER

## 2024-09-09 PROCEDURE — 99213 OFFICE O/P EST LOW 20 MIN: CPT | Mod: S$GLB,,, | Performed by: NURSE PRACTITIONER

## 2024-09-09 PROCEDURE — 1159F MED LIST DOCD IN RCRD: CPT | Mod: CPTII,S$GLB,, | Performed by: NURSE PRACTITIONER

## 2024-09-09 PROCEDURE — 3044F HG A1C LEVEL LT 7.0%: CPT | Mod: CPTII,S$GLB,, | Performed by: NURSE PRACTITIONER

## 2024-09-09 PROCEDURE — 3008F BODY MASS INDEX DOCD: CPT | Mod: CPTII,S$GLB,, | Performed by: NURSE PRACTITIONER

## 2024-09-09 PROCEDURE — 3075F SYST BP GE 130 - 139MM HG: CPT | Mod: CPTII,S$GLB,, | Performed by: NURSE PRACTITIONER

## 2024-09-09 PROCEDURE — 99999 PR PBB SHADOW E&M-EST. PATIENT-LVL IV: CPT | Mod: PBBFAC,,, | Performed by: NURSE PRACTITIONER

## 2024-09-09 PROCEDURE — G2211 COMPLEX E/M VISIT ADD ON: HCPCS | Mod: S$GLB,,, | Performed by: NURSE PRACTITIONER

## 2024-09-09 RX ORDER — TOBRAMYCIN 3 MG/ML
1 SOLUTION/ DROPS OPHTHALMIC EVERY 6 HOURS
Qty: 5 ML | Refills: 0 | Status: SHIPPED | OUTPATIENT
Start: 2024-09-09

## 2024-09-09 RX ORDER — AMOXICILLIN 875 MG/1
875 TABLET, FILM COATED ORAL EVERY 12 HOURS
Qty: 20 TABLET | Refills: 0 | Status: SHIPPED | OUTPATIENT
Start: 2024-09-09 | End: 2024-09-19

## 2024-09-09 NOTE — PATIENT INSTRUCTIONS
Home COVID-19 test recommended  Hydrate well  Rest  Strict handwashing  Report to ER immediately if symptoms worsen or persist    Isaac Weaver,     If you are due for any health screening(s) below please notify me so we can arrange them to be ordered and scheduled. Most healthy patients at your age complete them, but you are free to accept or refuse.     If you can't do it, I'll definitely understand. If you can, I'd certainly appreciate it!    All of your core healthy metrics are met.

## 2024-09-09 NOTE — PROGRESS NOTES
Subjective     Patient ID: Meg Goff is a 64 y.o. female.    Chief Complaint: Sinus Problem and Conjunctivitis    Sinus Problem  This is a new problem. The current episode started in the past 7 days. The problem is unchanged. There has been no fever. The pain is mild. Associated symptoms include sinus pressure. Pertinent negatives include no chills, congestion, coughing, diaphoresis, ear pain, headaches, hoarse voice, neck pain, shortness of breath, sneezing, sore throat or swollen glands. Past treatments include nothing (No POCT COVID-19 tests available in clinic today; pt states will do home test). The treatment provided no relief.   Conjunctivitis  This is a new (right eye) problem. The current episode started yesterday. The problem occurs daily. The problem has been unchanged. Pertinent negatives include no abdominal pain, anorexia, arthralgias, change in bowel habit, chest pain, chills, congestion, coughing, diaphoresis, fatigue, fever, headaches, joint swelling, myalgias, nausea, neck pain, numbness, rash, sore throat, swollen glands, urinary symptoms, vertigo, visual change, vomiting or weakness. Nothing aggravates the symptoms. She has tried nothing for the symptoms. The treatment provided no relief.     Past Medical History:   Diagnosis Date    Atopic dermatitis 09/27/2023    Celiac disease     DDD (degenerative disc disease), cervical     Duodenitis     Frozen shoulder syndrome     H. pylori infection     Major depressive disorder with current active episode 08/23/2021    Menopausal symptoms     Remote history of TIA 05/2008     Social History     Socioeconomic History    Marital status:    Tobacco Use    Smoking status: Never    Smokeless tobacco: Never   Substance and Sexual Activity    Alcohol use: Yes     Comment: rarely    Drug use: No     Social Determinants of Health     Food Insecurity: No Food Insecurity (3/25/2024)    Hunger Vital Sign     Worried About Running Out of Food in the  Last Year: Never true     Ran Out of Food in the Last Year: Never true   Transportation Needs: No Transportation Needs (3/25/2024)    PRAPARE - Transportation     Lack of Transportation (Medical): No     Lack of Transportation (Non-Medical): No   Physical Activity: Inactive (3/25/2024)    Exercise Vital Sign     Days of Exercise per Week: 0 days     Minutes of Exercise per Session: 0 min   Stress: Stress Concern Present (3/25/2024)    [employer] [place] [employer] - Occupational Stress Questionnaire     Feeling of Stress : To some extent   Housing Stability: Low Risk  (3/25/2024)    Housing Stability Vital Sign     Unable to Pay for Housing in the Last Year: No     Number of Places Lived in the Last Year: 1     Unstable Housing in the Last Year: No     Past Surgical History:   Procedure Laterality Date     SECTION      COLONOSCOPY      COLONOSCOPY N/A 2016    Procedure: COLONOSCOPY;  Surgeon: Roberta Boateng MD;  Location: [facility];  Service: Endoscopy;  Laterality: N/A;    COLONOSCOPY N/A 10/31/2023    Procedure: COLONOSCOPY;  Surgeon: Maureen Santacruz MD;  Location: [facility];  Service: Endoscopy;  Laterality: N/A;    ESOPHAGOGASTRODUODENOSCOPY  [id number]    ESOPHAGOGASTRODUODENOSCOPY Left 3/12/2020    Procedure: ESOPHAGOGASTRODUODENOSCOPY (EGD);  Surgeon: Maureen Santacruz MD;  Location: [facility];  Service: Endoscopy;  Laterality: Left;    ESOPHAGOGASTRODUODENOSCOPY N/A 2023    Procedure: SBE (ENTEROSCOPY);  Surgeon: Maureen Santacruz MD;  Location: [facility];  Service: Endoscopy;  Laterality: N/A;  Hx celiac disease, needs jejunal and duodenal biopsies, diarrhea    TUBAL LIGATION         Review of Systems   Constitutional:  Negative for activity change, chills, diaphoresis, fatigue, fever and unexpected weight change.   HENT:  Positive for rhinorrhea and sinus pressure/congestion. Negative for nasal congestion, ear pain, hearing loss, hoarse voice, sneezing, sore throat  and trouble swallowing.    Eyes:  Positive for pain, discharge and redness.   Respiratory: Negative.  Negative for cough, chest tightness, shortness of breath and wheezing.    Cardiovascular: Negative.  Negative for chest pain and palpitations.   Gastrointestinal: Negative.  Negative for abdominal pain, anorexia, blood in stool, change in bowel habit, constipation, diarrhea, nausea and vomiting.   Endocrine: Negative.  Negative for polydipsia and polyuria.   Genitourinary: Negative.  Negative for difficulty urinating, dysuria, hematuria and menstrual problem.   Musculoskeletal: Negative.  Negative for arthralgias, joint swelling, myalgias and neck pain.   Integumentary:  Negative for rash. Negative.   Allergic/Immunologic: Negative.    Neurological: Negative.  Negative for vertigo, weakness, numbness and headaches.   Psychiatric/Behavioral: Negative.  Negative for confusion and dysphoric mood.           Objective     Physical Exam  Vitals and nursing note reviewed.   Constitutional:       Appearance: Normal appearance.   HENT:      Head: Normocephalic.      Right Ear: Hearing, tympanic membrane, ear canal and external ear normal.      Left Ear: Hearing, tympanic membrane, ear canal and external ear normal.      Nose: Congestion and rhinorrhea present.      Right Sinus: Maxillary sinus tenderness present. No frontal sinus tenderness.      Left Sinus: No maxillary sinus tenderness or frontal sinus tenderness.      Mouth/Throat:      Mouth: Mucous membranes are moist.      Pharynx: Oropharynx is clear.   Eyes:      Conjunctiva/sclera:      Right eye: Right conjunctiva is injected. No chemosis, exudate or hemorrhage.     Left eye: Left conjunctiva is not injected. No chemosis, exudate or hemorrhage.     Pupils: Pupils are equal, round, and reactive to light.   Cardiovascular:      Rate and Rhythm: Normal rate and regular rhythm.      Pulses: Normal pulses.      Heart sounds: Normal heart sounds.   Pulmonary:       Effort: Pulmonary effort is normal.      Breath sounds: Normal breath sounds.   Abdominal:      General: Bowel sounds are normal.      Palpations: Abdomen is soft.   Musculoskeletal:         General: Normal range of motion.      Cervical back: Normal range of motion and neck supple.   Skin:     General: Skin is warm.      Capillary Refill: Capillary refill takes 2 to 3 seconds.   Neurological:      Mental Status: She is alert and oriented to person, place, and time.   Psychiatric:         Mood and Affect: Mood normal.         Behavior: Behavior normal.         Thought Content: Thought content normal.         Judgment: Judgment normal.            Assessment and Plan     1. Conjunctivitis, unspecified conjunctivitis type, unspecified laterality  2. Sinusitis, unspecified chronicity, unspecified location  -     tobramycin sulfate 0.3% (TOBREX) 0.3 % ophthalmic solution; Place 1 drop into the right eye every 6 (six) hours.  Dispense: 5 mL; Refill: 0  -     amoxicillin (AMOXIL) 875 MG tablet; Take 1 tablet (875 mg total) by mouth every 12 (twelve) hours. for 10 days  Dispense: 20 tablet; Refill: 0  Home COVID-19 test recommended  Hydrate well  Rest  Strict handwashing  Report to ER immediately if symptoms worsen or persist             No follow-ups on file.

## 2024-09-24 ENCOUNTER — PATIENT MESSAGE (OUTPATIENT)
Dept: FAMILY MEDICINE | Facility: CLINIC | Age: 64
End: 2024-09-24
Payer: COMMERCIAL

## 2024-09-24 ENCOUNTER — E-VISIT (OUTPATIENT)
Dept: FAMILY MEDICINE | Facility: CLINIC | Age: 64
End: 2024-09-24
Payer: COMMERCIAL

## 2024-09-24 ENCOUNTER — HOSPITAL ENCOUNTER (OUTPATIENT)
Dept: RADIOLOGY | Facility: HOSPITAL | Age: 64
Discharge: HOME | End: 2024-09-24
Attending: FAMILY MEDICINE
Payer: COMMERCIAL

## 2024-09-24 DIAGNOSIS — Z09 FOLLOW-UP EXAM: Primary | ICD-10-CM

## 2024-09-24 DIAGNOSIS — Z12.31 ENCOUNTER FOR SCREENING MAMMOGRAM FOR BREAST CANCER: ICD-10-CM

## 2024-09-24 DIAGNOSIS — J32.9 SINUSITIS, UNSPECIFIED CHRONICITY, UNSPECIFIED LOCATION: ICD-10-CM

## 2024-09-24 DIAGNOSIS — H10.9 CONJUNCTIVITIS, UNSPECIFIED CONJUNCTIVITIS TYPE, UNSPECIFIED LATERALITY: Primary | ICD-10-CM

## 2024-09-24 PROCEDURE — 77067 SCR MAMMO BI INCL CAD: CPT | Mod: TC,PO

## 2024-09-24 PROCEDURE — 77067 SCR MAMMO BI INCL CAD: CPT | Mod: 26,,, | Performed by: RADIOLOGY

## 2024-09-24 PROCEDURE — 77063 BREAST TOMOSYNTHESIS BI: CPT | Mod: 26,,, | Performed by: RADIOLOGY

## 2024-09-24 RX ORDER — METHYLPREDNISOLONE 4 MG/1
TABLET ORAL
Qty: 21 TABLET | Refills: 0 | Status: SHIPPED | OUTPATIENT
Start: 2024-09-24 | End: 2024-10-15

## 2024-09-24 NOTE — PROGRESS NOTES
Patient ID: Meg Goff is a 64 y.o. female.    Chief Complaint: General Illness (Entered automatically based on patient selection in [employer].)    The patient initiated a request through [employer] on 9/24/2024 for evaluation and management with a chief complaint of General Illness (Entered automatically based on patient selection in [employer].)     I evaluated the questionnaire submission on 9/24/24.    [facility] Peq Evisit General    9/24/2024  9:58 AM CDT - Filed by Patient   Do you agree to participate in an E-Visit? Yes   If you have any of the following symptoms, please present to your local emergency room or call 911:  I acknowledge   Choose the state of your primary residence [place]   What is the main issue you would like addressed today? sinus issues   Please describe your symptoms nose just starts running when I bend my head over - clear   Where is your problem located? sinues   How severe are your symptoms? Moderate   Have you had these symptoms before? Yes   How long have you been having these symptoms? For one to four weeks   Please list any medications or treatments you have used for your condition and indicate if it was effective or not. was recently on Amoxillian and eye drops for pink eye   What makes this feel better? nothing just blowing nose   What makes this feel worse? doing nothing   Are these symptoms related to a condition that you currently have? I am not sure   What is the condition? sinus   When were you last seen for this condition? 9/9/2024   Please describe any probable cause for these symptoms not sure - change in season i guess   Provide any additional information you feel is important. not running fever   Please attach any relevant images or files    Are you able to take your vital signs? No          Active Problem List with Overview Notes    Diagnosis Date Noted    Diarrhea in adult patient 10/31/2023    Atopic dermatitis 09/27/2023    Frozen shoulder syndrome     Celiac disease      DDD (degenerative disc disease), cervical     Menopausal symptoms     Remote history of TIA 05/2008      Recent Labs Obtained:  No visits with results within 7 Day(s) from this visit.   Latest known visit with results is:   Lab Visit on 06/28/2024   Component Date Value Ref Range Status    Sodium 06/28/2024 142  136 - 145 mmol/L Final    Potassium 06/28/2024 4.8  3.5 - 5.1 mmol/L Final    Chloride 06/28/2024 111 (H)  95 - 110 mmol/L Final    CO2 06/28/2024 23  23 - 29 mmol/L Final    Glucose 06/28/2024 101  70 - 110 mg/dL Final    BUN 06/28/2024 18  8 - 23 mg/dL Final    Creatinine 06/28/2024 1.1  0.5 - 1.4 mg/dL Final    Calcium 06/28/2024 9.3  8.7 - 10.5 mg/dL Final    Anion Gap 06/28/2024 8  8 - 16 mmol/L Final    eGFR 06/28/2024 56.1 (A)  >60 mL/min/1.73 m^2 Final       Encounter Diagnoses   Name Primary?    Follow-up exam Yes    Sinusitis, unspecified chronicity, unspecified location         No orders of the defined types were placed in this encounter.     Medications Ordered This Encounter   Medications    methylPREDNISolone (MEDROL DOSEPACK) 4 mg tablet     Sig: use as directed     Dispense:  21 tablet     Refill:  0        E-Visit Time Tracking:10 min

## 2024-11-19 ENCOUNTER — PATIENT MESSAGE (OUTPATIENT)
Dept: NEUROLOGY | Facility: CLINIC | Age: 64
End: 2024-11-19
Payer: COMMERCIAL

## 2025-02-26 DIAGNOSIS — Z78.0 MENOPAUSE: ICD-10-CM

## 2025-03-18 RX ORDER — FLUTICASONE PROPIONATE 50 MCG
SPRAY, SUSPENSION (ML) NASAL
Qty: 48 G | Refills: 0 | Status: SHIPPED | OUTPATIENT
Start: 2025-03-18

## 2025-03-18 NOTE — TELEPHONE ENCOUNTER
Refill Decision Note   Meg Denver  is requesting a refill authorization.  Brief Assessment and Rationale for Refill:  Approve     Medication Therapy Plan:        Comments:     Note composed:10:40 AM 03/18/2025

## 2025-03-18 NOTE — TELEPHONE ENCOUNTER
No care due was identified.  [facility] Embedded Care Due Messages. Reference number: [id number].   3/18/2025 9:26:25 AM CDT

## 2025-04-14 ENCOUNTER — LAB VISIT (OUTPATIENT)
Dept: LAB | Facility: HOSPITAL | Age: 65
End: 2025-04-14
Attending: STUDENT IN AN ORGANIZED HEALTH CARE EDUCATION/TRAINING PROGRAM
Payer: COMMERCIAL

## 2025-04-14 DIAGNOSIS — H43.89 VITRITIS: ICD-10-CM

## 2025-04-14 DIAGNOSIS — H35.069 RETINAL VASCULITIS, UNSPECIFIED LATERALITY: ICD-10-CM

## 2025-04-14 DIAGNOSIS — H30.119: ICD-10-CM

## 2025-04-14 LAB
CRP SERPL-MCNC: 6.1 MG/L
ERYTHROCYTE [DISTWIDTH] IN BLOOD BY AUTOMATED COUNT: 14.3 % (ref 11.5–14.5)
ERYTHROCYTE [SEDIMENTATION RATE] IN BLOOD: 8 MM/HR
HCT VFR BLD AUTO: 44.9 % (ref 37–48.5)
HGB BLD-MCNC: 14.2 GM/DL (ref 12–16)
MCH RBC QN AUTO: 29.4 PG (ref 27–31)
MCHC RBC AUTO-ENTMCNC: 31.6 G/DL (ref 32–36)
MCV RBC AUTO: 93 FL (ref 82–98)
PLATELET # BLD AUTO: 288 K/UL (ref 150–450)
PMV BLD AUTO: 12.8 FL (ref 9.2–12.9)
RBC # BLD AUTO: 4.83 M/UL (ref 4–5.4)
RHEUMATOID FACT SERPL-ACNC: <13 IU/ML
WBC # BLD AUTO: 8.67 K/UL (ref 3.9–12.7)

## 2025-04-14 PROCEDURE — 86140 C-REACTIVE PROTEIN: CPT

## 2025-04-14 PROCEDURE — 85651 RBC SED RATE NONAUTOMATED: CPT

## 2025-04-14 PROCEDURE — 82164 ANGIOTENSIN I ENZYME TEST: CPT

## 2025-04-14 PROCEDURE — 85027 COMPLETE CBC AUTOMATED: CPT

## 2025-04-14 PROCEDURE — 81374 HLA I TYPING 1 ANTIGEN LR: CPT | Performed by: OPHTHALMOLOGY

## 2025-04-14 PROCEDURE — 36415 COLL VENOUS BLD VENIPUNCTURE: CPT | Mod: PO

## 2025-04-14 PROCEDURE — 86431 RHEUMATOID FACTOR QUANT: CPT

## 2025-04-14 PROCEDURE — 86235 NUCLEAR ANTIGEN ANTIBODY: CPT | Mod: 59

## 2025-04-14 PROCEDURE — 86036 ANCA SCREEN EACH ANTIBODY: CPT

## 2025-04-14 PROCEDURE — 86480 TB TEST CELL IMMUN MEASURE: CPT

## 2025-04-14 PROCEDURE — 86039 ANTINUCLEAR ANTIBODIES (ANA): CPT

## 2025-04-14 PROCEDURE — 86592 SYPHILIS TEST NON-TREP QUAL: CPT

## 2025-04-14 PROCEDURE — 86225 DNA ANTIBODY NATIVE: CPT

## 2025-04-14 PROCEDURE — 86235 NUCLEAR ANTIGEN ANTIBODY: CPT

## 2025-04-15 LAB
ANA (OHS): POSITIVE
ANA PATTERN 1 (OHS): ABNORMAL
ANA TITER 1 (OHS): ABNORMAL
MITOGEN MINUS NIL (OHS): 9.98
NIL TB SYNCED (OHS): 0.02
QUANTIFERON GOLD INTERP (OHS): NEGATIVE
RPR SER QL: NORMAL
TB1 AG MINUS NIL (OHS): <0
TB2 AG MINUS NIL (OHS): <0

## 2025-04-16 LAB
ACE SERPL-CCNC: 25 U/L (ref 16–85)
DSDNA ANTIBODY (OHS): NORMAL
DSDNA ANTIBODY TITER (OHS): NORMAL

## 2025-04-17 LAB
SM  ANTIBODY (OHS): 0.11 RATIO
SM INTERPRETATION (OHS): NEGATIVE
SM/RNP ANTIBODY (OHS): 0.11 RATIO
SM/RNP INTERPRETATION (OHS): NEGATIVE
SSA  ANTIBODY (OHS): 0.14 RATIO (ref 0–0.99)
SSA INTERPRETATION (OHS): NEGATIVE
SSB  ANTIBODY (OHS): 0.09 RATIO
SSB INTERPRETATION (OHS): NEGATIVE
W C-ANCA: NORMAL TITER
W P-ANCA: NORMAL TITER

## 2025-04-23 ENCOUNTER — TELEPHONE (OUTPATIENT)
Dept: FAMILY MEDICINE | Facility: CLINIC | Age: 65
End: 2025-04-23
Payer: COMMERCIAL

## 2025-04-23 NOTE — TELEPHONE ENCOUNTER
----- Message from Teto sent at 4/23/2025  9:37 AM CDT -----  Contact: self  Type:  Needs Medical AdviceWho Called: Meg Jose the patient rather a call back or a response via [facility]? Call Lg Call Back Number: [phone] Information: the patient was calling to see if her blood work had radhika sent to her doctor that she has a follow up visit with on friday  Fax number [phone]

## 2025-04-23 NOTE — TELEPHONE ENCOUNTER
Patient informed lab has Dr Trent as ordering provider so the lab results should go to this provider

## 2025-05-02 ENCOUNTER — PATIENT MESSAGE (OUTPATIENT)
Dept: FAMILY MEDICINE | Facility: CLINIC | Age: 65
End: 2025-05-02
Payer: COMMERCIAL

## 2025-05-05 ENCOUNTER — OFFICE VISIT (OUTPATIENT)
Dept: FAMILY MEDICINE | Facility: CLINIC | Age: 65
End: 2025-05-05
Payer: COMMERCIAL

## 2025-05-05 VITALS
BODY MASS INDEX: 28.73 KG/M2 | WEIGHT: 168.31 LBS | HEIGHT: 64 IN | TEMPERATURE: 98 F | HEART RATE: 58 BPM | DIASTOLIC BLOOD PRESSURE: 73 MMHG | SYSTOLIC BLOOD PRESSURE: 153 MMHG

## 2025-05-05 DIAGNOSIS — R73.03 PREDIABETES: ICD-10-CM

## 2025-05-05 DIAGNOSIS — Z86.73 REMOTE HISTORY OF TIA: ICD-10-CM

## 2025-05-05 DIAGNOSIS — K90.0 CELIAC DISEASE: ICD-10-CM

## 2025-05-05 DIAGNOSIS — Z78.0 ASYMPTOMATIC MENOPAUSE: ICD-10-CM

## 2025-05-05 DIAGNOSIS — R10.32 LEFT LOWER QUADRANT PAIN: ICD-10-CM

## 2025-05-05 DIAGNOSIS — Z00.00 ROUTINE HISTORY AND PHYSICAL EXAMINATION OF ADULT: Primary | ICD-10-CM

## 2025-05-05 DIAGNOSIS — M85.80 OSTEOPENIA, UNSPECIFIED LOCATION: ICD-10-CM

## 2025-05-05 DIAGNOSIS — R03.0 ELEVATED BLOOD-PRESSURE READING WITHOUT DIAGNOSIS OF HYPERTENSION: ICD-10-CM

## 2025-05-05 PROCEDURE — 1101F PT FALLS ASSESS-DOCD LE1/YR: CPT | Mod: CPTII,S$GLB,, | Performed by: FAMILY MEDICINE

## 2025-05-05 PROCEDURE — 3008F BODY MASS INDEX DOCD: CPT | Mod: CPTII,S$GLB,, | Performed by: FAMILY MEDICINE

## 2025-05-05 PROCEDURE — 99397 PER PM REEVAL EST PAT 65+ YR: CPT | Mod: S$GLB,,, | Performed by: FAMILY MEDICINE

## 2025-05-05 PROCEDURE — 3078F DIAST BP <80 MM HG: CPT | Mod: CPTII,S$GLB,, | Performed by: FAMILY MEDICINE

## 2025-05-05 PROCEDURE — 3077F SYST BP >= 140 MM HG: CPT | Mod: CPTII,S$GLB,, | Performed by: FAMILY MEDICINE

## 2025-05-05 PROCEDURE — 3288F FALL RISK ASSESSMENT DOCD: CPT | Mod: CPTII,S$GLB,, | Performed by: FAMILY MEDICINE

## 2025-05-05 PROCEDURE — 99999 PR PBB SHADOW E&M-EST. PATIENT-LVL III: CPT | Mod: PBBFAC,,, | Performed by: FAMILY MEDICINE

## 2025-05-05 RX ORDER — DIFLUPREDNATE OPHTHALMIC 0.5 MG/ML
1 EMULSION OPHTHALMIC 2 TIMES DAILY
COMMUNITY
Start: 2025-04-11

## 2025-05-05 RX ORDER — MELOXICAM 15 MG/1
15 TABLET ORAL
COMMUNITY
Start: 2025-04-22

## 2025-05-05 RX ORDER — AMOXICILLIN AND CLAVULANATE POTASSIUM 875; 125 MG/1; MG/1
1 TABLET, FILM COATED ORAL EVERY 12 HOURS
Qty: 14 TABLET | Refills: 0 | Status: SHIPPED | OUTPATIENT
Start: 2025-05-05 | End: 2025-05-12

## 2025-05-05 NOTE — PATIENT INSTRUCTIONS
Health Maintenance Due   Topic Date Due    HIV Screening  Never done    High Dose Statin  Never done    Pneumococcal Vaccines (Age 50+) (2 of 2 - PCV) 10/28/2014    DEXA Scan  05/24/2019    RSV Vaccine (Age 60+ and Pregnant patients) (1 - Risk 60-74 years 1-dose series) Never done    COVID-19 Vaccine (4 - 2024-25 season) 09/01/2024    TETANUS VACCINE  09/20/2024    Hemoglobin A1c (Prediabetes)  03/27/2025

## 2025-05-05 NOTE — PROGRESS NOTES
Presents physical exam  History of Present Illness    Ms. Goff reports eye inflammation in the right eye, characterized by blurriness and visual disturbances. She was evaluated by an eye doctor and a specialist, who performed labs. The JOE test came back positive, leading the eye doctor to suspect a potential autoimmune issue. She has scheduled an appointment with a rheumatologist for September.    Ms. Goff describes severe symptoms reminiscent of her previous celiac disease reactions, including tenderness in the intestines, aching hips and hands, abdominal pain, and chills. She initially attributed these symptoms to a possible stomach virus due to exposure to ill individuals. The GI discomfort was particularly severe, with intense pain in her left lower intestine that radiated to her side.    These symptoms began about 3 weeks ago, with the most severe pain occurring in the past week. She started taking meloxicam found in her medicine cabinet, which she reports helped with most of the problems except for her eye. Her stools were loose and dark during this period, but she did not have diarrhea.    Ms. Goff has a history of celiac disease and was previously diagnosed with diverticulosis. She had an episode of either colitis or diverticulitis about 1.5 years ago, for which she received antibiotics. She also mentions a past TIA (Transient Ischemic Attack) in 2008, during which one side of her face went numb and she had difficulty speaking for a few hours. She underwent a 3-day heart monitor test following this incident, which came back normal.    Ms. Goff denies having fever or any urinary problems.      ROS:  General: +chills  Eyes: +redness, +eye swelling, +blurry vision, +abnormal eye movement  Gastrointestinal: +abdominal pain, -diarrhea  Musculoskeletal: +joint pain, +pain with movement, +difficulty standing up, +limb pain, +body aches           Meg was seen today for annual  exam.    Diagnoses and all orders for this visit:    Routine history and physical examination of adult    Prediabetes  -     Comprehensive Metabolic Panel; Future  -     Lipid Panel; Future  -     Hemoglobin A1C; Future    Celiac disease  -     Tissue Transglutaminase, IgA; Future    Remote history of TIA    Elevated blood-pressure reading without diagnosis of hypertension    Left lower quadrant pain  -     Urinalysis, Reflex to Urine Culture Urine, Clean Catch; Future  -     Urinalysis, Reflex to Urine Culture Urine, Clean Catch  -     Urinalysis, Reflex to Urine Culture Urine, Clean Catch; Future    Osteopenia, unspecified location  -     DXA Bone Density Axial Skeleton 1 or more sites; Future    Asymptomatic menopause  -     DXA Bone Density Axial Skeleton 1 or more sites; Future    Other orders  -     amoxicillin-clavulanate 875-125mg (AUGMENTIN) 875-125 mg per tablet; Take 1 tablet by mouth every 12 (twelve) hours. for 7 days      IMPRESSION:  - Considered autoimmune etiology for eye inflammation based on positive JOE test, but noted and explained that JOE positivity alone is not definitive or necessarily indicate an autoimmune condition.  - Assessed recent abdominal pain, considering possibilities like diverticulitis or colitis exacerbation.  - Initiated empiric treatment for diverticulitis with Augmentin for 1 week given history and current symptoms.  - Evaluated need for aspirin therapy given remote history of TIA and recommend restart aspirin 81 mg daily for TIA prevention.  - Considered statin therapy due to TIA history, but deferred decision pending cholesterol results.  - Considered that meloxicam, an NSAID, is not typically recommended for patients with celiac disease or kidney issues and discontinued until renal function results are available and further evaluation is completed.  - Instructed patient to maintain a bland, liquid diet and to contact office if abdominal symptoms do not improve within a  week or worsen before scheduled GI appointment on the .  Recheck blood pressure when she comes in for her lab work.  Reviewed recommended immunizations  Colonoscopy up-to-date.    This note was generated with the assistance of ambient listening technology. Verbal consent was obtained by the patient and accompanying visitor(s) for the recording of patient appointment to facilitate this note. I attest to having reviewed and edited the generated note for accuracy, though some syntax or spelling errors may persist. Please contact the author of this note for any clarification.        Past Medical History:  Past Medical History:   Diagnosis Date    Atopic dermatitis 2023    Celiac disease     DDD (degenerative disc disease), cervical     Duodenitis     Frozen shoulder syndrome     H. pylori infection     Major depressive disorder with current active episode 2021    Menopausal symptoms     Prediabetes 2025    Remote history of TIA 2008     Past Surgical History:   Procedure Laterality Date     SECTION      COLONOSCOPY      COLONOSCOPY N/A 2016    Procedure: COLONOSCOPY;  Surgeon: Roberta Boateng MD;  Location: [facility];  Service: Endoscopy;  Laterality: N/A;    COLONOSCOPY N/A 10/31/2023    Procedure: COLONOSCOPY;  Surgeon: Maureen Santacruz MD;  Location: [facility];  Service: Endoscopy;  Laterality: N/A;    ESOPHAGOGASTRODUODENOSCOPY  [id number]    ESOPHAGOGASTRODUODENOSCOPY Left 3/12/2020    Procedure: ESOPHAGOGASTRODUODENOSCOPY (EGD);  Surgeon: Maureen Santacruz MD;  Location: [facility];  Service: Endoscopy;  Laterality: Left;    ESOPHAGOGASTRODUODENOSCOPY N/A 2023    Procedure: SBE (ENTEROSCOPY);  Surgeon: Maureen Santacruz MD;  Location: [facility];  Service: Endoscopy;  Laterality: N/A;  Hx celiac disease, needs jejunal and duodenal biopsies, diarrhea    TUBAL LIGATION       Review of patient's allergies indicates:   Allergen Reactions    Gluten protein     Sulfa  "(sulfonamide antibiotics)     Sulfur      Medications Ordered Prior to Encounter[1]  Social History[2]  Family History   Problem Relation Name Age of Onset    Lung cancer Mother      Heart failure Father      Anuerysm Father          abd    Seizures Paternal Aunt      Breast cancer Sister      Ovarian cancer Sister      Allergic rhinitis Neg Hx      Asthma Neg Hx      Atopy Neg Hx      Eczema Neg Hx      Immunodeficiency Neg Hx      Rhinitis Neg Hx      Urticaria Neg Hx      Angioedema Neg Hx      Allergies Neg Hx             ROS:  GENERAL: No fever, chills,  or significant weight changes.   CARDIOVASCULAR: Denies chest pain, PND, orthopnea or reduced exercise tolerance.  ABDOMEN: Appetite fine. Denies diarrhea, abdominal pain, hematemesis or blood in stool.  URINARY: No flank pain, dysuria or hematuria.    Vitals:    05/05/25 1355   BP: (!) 153/73   Pulse: (!) 58   Temp: 97.5 °F (36.4 °C)   TempSrc: Temporal   Weight: 76.3 kg (168 lb 4.8 oz)   Height: 5' 4" (1.626 m)     Wt Readings from Last 3 Encounters:   05/05/25 76.3 kg (168 lb 4.8 oz)   09/09/24 78.8 kg (173 lb 12.8 oz)   04/22/24 75.8 kg (167 lb)       OBJECTIVE:   APPEARANCE: Well nourished, well developed, in no acute distress.    HEAD: Normocephalic.  Atraumatic.  No sinus tenderness.  EYES:   Right eye: Pupil reactive.  Conjunctiva clear.    Left eye: Pupil reactive.  Conjunctiva clear.  EOMI.    EARS: TM's intact. Light reflex normal. No retraction or perforation.    NOSE:  clear.  MOUTH & THROAT:  No pharyngeal erythema or exudate. No lesions.  NECK: Supple. No bruits.  No JVD.  No cervical lymphadenopathy.  No thyromegaly.    CHEST: Breath sounds clear bilaterally.  Normal respiratory effort  CARDIOVASCULAR: Normal rate.  Regular rhythm.  No murmurs.  No rub.  No gallops.  ABDOMEN: Bowel sounds normal.  Soft.  Minimal left lower quadrant tenderness.  No rebound.  No guarding.  No organomegaly.  PERIPHERAL VASCULAR: No cyanosis.  No clubbing.  No " edema.  NEUROLOGIC: No focal findings.  MENTAL STATUS: Alert.  Oriented x 3.             [1]   Current Outpatient Medications on File Prior to Visit   Medication Sig Dispense Refill    cetirizine (ZYRTEC) 10 MG tablet Take 10 mg by mouth once daily.      difluprednate (DUREZOL) 0.05 % Drop ophthalmic solution Place 1 drop into the right eye 2 (two) times daily.      fluticasone propionate (FLONASE) 50 mcg/actuation nasal spray SPRAY 2 SPRAYS (100MCG TOTAL) in each nostril EVERY DAY 48 g 0    melatonin 10 mg Tab Take by mouth.      meloxicam (MOBIC) 15 MG tablet Take 15 mg by mouth.      aspirin (ECOTRIN) 81 MG EC tablet Take 1 tablet (81 mg total) by mouth once daily.      [DISCONTINUED] ascorbic acid, vitamin C, (VITAMIN C) 100 MG tablet Vitamin C Take No date recorded No form recorded No frequency recorded No route recorded No set duration recorded No set duration amount recorded active No dosage strength recorded No dosage strength units of measure recorded      [DISCONTINUED] pantoprazole (PROTONIX) 40 MG tablet Take 1 tablet (40 mg total) by mouth once daily. 30 tablet 0    [DISCONTINUED] tobramycin sulfate 0.3% (TOBREX) 0.3 % ophthalmic solution Place 1 drop into the right eye every 6 (six) hours. 5 mL 0     No current facility-administered medications on file prior to visit.   [2]   Social History  Socioeconomic History    Marital status:    Tobacco Use    Smoking status: Never    Smokeless tobacco: Never   Substance and Sexual Activity    Alcohol use: Yes     Comment: rarely    Drug use: No     Social Drivers of Health     Financial Resource Strain: Low Risk  (5/4/2025)    Overall Financial Resource Strain ([facility])     Difficulty of Paying Living Expenses: Not hard at all   Food Insecurity: No Food Insecurity (5/4/2025)    Hunger Vital Sign     Worried About Running Out of Food in the Last Year: Never true     Ran Out of Food in the Last Year: Never true   Transportation Needs: No Transportation Needs  (5/4/2025)    PRAPARE - Transportation     Lack of Transportation (Medical): No     Lack of Transportation (Non-Medical): No   Physical Activity: Insufficiently Active (5/4/2025)    Exercise Vital Sign     Days of Exercise per Week: 1 day     Minutes of Exercise per Session: 30 min   Stress: No Stress Concern Present (5/4/2025)    [employer] - Occupational Stress Questionnaire     Feeling of Stress : Only a little   Housing Stability: Low Risk  (5/4/2025)    Housing Stability Vital Sign     Unable to Pay for Housing in the Last Year: No     Number of Times Moved in the Last Year: 0     Homeless in the Last Year: No

## 2025-05-06 ENCOUNTER — LAB VISIT (OUTPATIENT)
Dept: LAB | Facility: HOSPITAL | Age: 65
End: 2025-05-06
Attending: FAMILY MEDICINE
Payer: COMMERCIAL

## 2025-05-06 DIAGNOSIS — K90.0 CELIAC DISEASE: ICD-10-CM

## 2025-05-06 DIAGNOSIS — R73.03 PREDIABETES: ICD-10-CM

## 2025-05-06 DIAGNOSIS — R10.32 LEFT LOWER QUADRANT PAIN: ICD-10-CM

## 2025-05-06 LAB
ALBUMIN SERPL BCP-MCNC: 3.6 G/DL (ref 3.5–5.2)
ALP SERPL-CCNC: 79 UNIT/L (ref 40–150)
ALT SERPL W/O P-5'-P-CCNC: 13 UNIT/L (ref 10–44)
ANION GAP (OHS): 11 MMOL/L (ref 8–16)
AST SERPL-CCNC: 22 UNIT/L (ref 11–45)
BILIRUB SERPL-MCNC: 0.3 MG/DL (ref 0.1–1)
BILIRUB UR QL STRIP.AUTO: NEGATIVE
BUN SERPL-MCNC: 17 MG/DL (ref 8–23)
CALCIUM SERPL-MCNC: 9.6 MG/DL (ref 8.7–10.5)
CHLORIDE SERPL-SCNC: 106 MMOL/L (ref 95–110)
CHOLEST SERPL-MCNC: 187 MG/DL (ref 120–199)
CHOLEST/HDLC SERPL: 3.3 {RATIO} (ref 2–5)
CLARITY UR: CLEAR
CO2 SERPL-SCNC: 26 MMOL/L (ref 23–29)
COLOR UR AUTO: YELLOW
CREAT SERPL-MCNC: 0.9 MG/DL (ref 0.5–1.4)
EAG (OHS): 114 MG/DL (ref 68–131)
GFR SERPLBLD CREATININE-BSD FMLA CKD-EPI: >60 ML/MIN/1.73/M2
GLUCOSE SERPL-MCNC: 82 MG/DL (ref 70–110)
GLUCOSE UR QL STRIP: NEGATIVE
HBA1C MFR BLD: 5.6 % (ref 4–5.6)
HDLC SERPL-MCNC: 56 MG/DL (ref 40–75)
HDLC SERPL: 29.9 % (ref 20–50)
HGB UR QL STRIP: NEGATIVE
HOLD SPECIMEN: NORMAL
KETONES UR QL STRIP: NEGATIVE
LDLC SERPL CALC-MCNC: 109.2 MG/DL (ref 63–159)
LEUKOCYTE ESTERASE UR QL STRIP: NEGATIVE
NITRITE UR QL STRIP: NEGATIVE
NONHDLC SERPL-MCNC: 131 MG/DL
PH UR STRIP: 6 [PH]
POTASSIUM SERPL-SCNC: 4.3 MMOL/L (ref 3.5–5.1)
PROT SERPL-MCNC: 6.9 GM/DL (ref 6–8.4)
PROT UR QL STRIP: NEGATIVE
SODIUM SERPL-SCNC: 143 MMOL/L (ref 136–145)
SP GR UR STRIP: 1.01
TRIGL SERPL-MCNC: 109 MG/DL (ref 30–150)
UROBILINOGEN UR STRIP-ACNC: NEGATIVE EU/DL

## 2025-05-06 PROCEDURE — 36415 COLL VENOUS BLD VENIPUNCTURE: CPT | Mod: PO

## 2025-05-06 PROCEDURE — 86364 TISS TRNSGLTMNASE EA IG CLAS: CPT

## 2025-05-06 PROCEDURE — 82040 ASSAY OF SERUM ALBUMIN: CPT

## 2025-05-06 PROCEDURE — 81002 URINALYSIS NONAUTO W/O SCOPE: CPT

## 2025-05-06 PROCEDURE — 80061 LIPID PANEL: CPT

## 2025-05-06 PROCEDURE — 83036 HEMOGLOBIN GLYCOSYLATED A1C: CPT

## 2025-05-09 ENCOUNTER — RESULTS FOLLOW-UP (OUTPATIENT)
Dept: FAMILY MEDICINE | Facility: CLINIC | Age: 65
End: 2025-05-09

## 2025-05-09 DIAGNOSIS — M85.80 OSTEOPENIA, UNSPECIFIED LOCATION: Primary | ICD-10-CM

## 2025-05-09 LAB — W TISSUE TRANSGLUTAMINASE IGA AB: 0.4 U/ML

## 2025-05-20 ENCOUNTER — CLINICAL SUPPORT (OUTPATIENT)
Dept: FAMILY MEDICINE | Facility: CLINIC | Age: 65
End: 2025-05-20
Payer: COMMERCIAL

## 2025-05-20 ENCOUNTER — HOSPITAL ENCOUNTER (OUTPATIENT)
Dept: RADIOLOGY | Facility: HOSPITAL | Age: 65
Discharge: HOME | End: 2025-05-20
Attending: FAMILY MEDICINE
Payer: COMMERCIAL

## 2025-05-20 VITALS — DIASTOLIC BLOOD PRESSURE: 68 MMHG | HEART RATE: 59 BPM | SYSTOLIC BLOOD PRESSURE: 123 MMHG

## 2025-05-20 DIAGNOSIS — Z01.30 BP CHECK: Primary | ICD-10-CM

## 2025-05-20 DIAGNOSIS — Z78.0 ASYMPTOMATIC MENOPAUSE: ICD-10-CM

## 2025-05-20 DIAGNOSIS — M85.80 OSTEOPENIA, UNSPECIFIED LOCATION: ICD-10-CM

## 2025-05-20 PROCEDURE — 77080 DXA BONE DENSITY AXIAL: CPT | Mod: TC,PO

## 2025-05-20 PROCEDURE — 77080 DXA BONE DENSITY AXIAL: CPT | Mod: 26,,, | Performed by: RADIOLOGY

## 2025-05-20 PROCEDURE — 99999 PR PBB SHADOW E&M-EST. PATIENT-LVL II: CPT | Mod: PBBFAC,,,

## 2025-05-27 PROBLEM — M85.80 OSTEOPENIA: Status: ACTIVE | Noted: 2025-05-27
